# Patient Record
Sex: FEMALE | Race: WHITE | Employment: OTHER | URBAN - METROPOLITAN AREA
[De-identification: names, ages, dates, MRNs, and addresses within clinical notes are randomized per-mention and may not be internally consistent; named-entity substitution may affect disease eponyms.]

---

## 2018-10-22 RX ORDER — EZETIMIBE 10 MG/1
10 TABLET ORAL NIGHTLY
COMMUNITY

## 2018-10-22 RX ORDER — SIMVASTATIN 40 MG
40 TABLET ORAL
COMMUNITY
End: 2018-12-27

## 2018-10-22 RX ORDER — OMEPRAZOLE 10 MG/1
20 CAPSULE, DELAYED RELEASE ORAL DAILY
COMMUNITY

## 2018-10-22 RX ORDER — SENNA PLUS 8.6 MG/1
1 TABLET ORAL EVERY OTHER DAY
COMMUNITY

## 2018-10-22 RX ORDER — FERROUS SULFATE 325(65) MG
325 TABLET ORAL 2 TIMES DAILY
COMMUNITY

## 2018-10-23 ENCOUNTER — ANESTHESIA EVENT (OUTPATIENT)
Dept: ENDOSCOPY | Age: 63
End: 2018-10-23
Payer: COMMERCIAL

## 2018-11-02 ENCOUNTER — ANESTHESIA (OUTPATIENT)
Dept: ENDOSCOPY | Age: 63
End: 2018-11-02
Payer: COMMERCIAL

## 2018-11-02 ENCOUNTER — HOSPITAL ENCOUNTER (OUTPATIENT)
Age: 63
Setting detail: OUTPATIENT SURGERY
Discharge: HOME | End: 2018-11-02
Attending: INTERNAL MEDICINE | Admitting: INTERNAL MEDICINE
Payer: COMMERCIAL

## 2018-11-02 VITALS
SYSTOLIC BLOOD PRESSURE: 127 MMHG | HEIGHT: 64 IN | WEIGHT: 208 LBS | RESPIRATION RATE: 14 BRPM | BODY MASS INDEX: 35.51 KG/M2 | TEMPERATURE: 98.1 F | OXYGEN SATURATION: 99 % | HEART RATE: 85 BPM | DIASTOLIC BLOOD PRESSURE: 76 MMHG

## 2018-11-02 VITALS — SYSTOLIC BLOOD PRESSURE: 129 MMHG | DIASTOLIC BLOOD PRESSURE: 85 MMHG | OXYGEN SATURATION: 94 %

## 2018-11-02 LAB
GLUCOSE BLD-MCNC: 85 MG/DL (ref 70–99)
PERFORMED ON: NORMAL

## 2018-11-02 PROCEDURE — 3700000000 HC ANESTHESIA ATTENDED CARE: Performed by: INTERNAL MEDICINE

## 2018-11-02 PROCEDURE — 2709999900 HC NON-CHARGEABLE SUPPLY: Performed by: INTERNAL MEDICINE

## 2018-11-02 PROCEDURE — 7100000011 HC PHASE II RECOVERY - ADDTL 15 MIN: Performed by: INTERNAL MEDICINE

## 2018-11-02 PROCEDURE — 6360000002 HC RX W HCPCS: Performed by: NURSE ANESTHETIST, CERTIFIED REGISTERED

## 2018-11-02 PROCEDURE — 2500000003 HC RX 250 WO HCPCS: Performed by: ANESTHESIOLOGY

## 2018-11-02 PROCEDURE — 2500000003 HC RX 250 WO HCPCS: Performed by: NURSE ANESTHETIST, CERTIFIED REGISTERED

## 2018-11-02 PROCEDURE — 3609012400 HC EGD TRANSORAL BIOPSY SINGLE/MULTIPLE: Performed by: INTERNAL MEDICINE

## 2018-11-02 PROCEDURE — 2580000003 HC RX 258: Performed by: FAMILY MEDICINE

## 2018-11-02 PROCEDURE — 7100000010 HC PHASE II RECOVERY - FIRST 15 MIN: Performed by: INTERNAL MEDICINE

## 2018-11-02 PROCEDURE — 2580000003 HC RX 258: Performed by: NURSE ANESTHETIST, CERTIFIED REGISTERED

## 2018-11-02 RX ORDER — SODIUM CHLORIDE 9 MG/ML
INJECTION, SOLUTION INTRAVENOUS CONTINUOUS PRN
Status: DISCONTINUED | OUTPATIENT
Start: 2018-11-02 | End: 2018-11-02 | Stop reason: SDUPTHER

## 2018-11-02 RX ORDER — SODIUM CHLORIDE 9 MG/ML
INJECTION, SOLUTION INTRAVENOUS CONTINUOUS
Status: DISCONTINUED | OUTPATIENT
Start: 2018-11-02 | End: 2018-11-02 | Stop reason: HOSPADM

## 2018-11-02 RX ORDER — PROPOFOL 10 MG/ML
INJECTION, EMULSION INTRAVENOUS PRN
Status: DISCONTINUED | OUTPATIENT
Start: 2018-11-02 | End: 2018-11-02 | Stop reason: SDUPTHER

## 2018-11-02 RX ORDER — METOPROLOL TARTRATE 5 MG/5ML
1 INJECTION INTRAVENOUS ONCE
Status: COMPLETED | OUTPATIENT
Start: 2018-11-02 | End: 2018-11-02

## 2018-11-02 RX ORDER — LIDOCAINE HYDROCHLORIDE 20 MG/ML
INJECTION, SOLUTION INFILTRATION; PERINEURAL PRN
Status: DISCONTINUED | OUTPATIENT
Start: 2018-11-02 | End: 2018-11-02 | Stop reason: SDUPTHER

## 2018-11-02 RX ORDER — METOPROLOL TARTRATE 5 MG/5ML
5 INJECTION INTRAVENOUS ONCE
Status: DISCONTINUED | OUTPATIENT
Start: 2018-11-02 | End: 2018-11-02 | Stop reason: HOSPADM

## 2018-11-02 RX ORDER — SODIUM CHLORIDE 0.9 % (FLUSH) 0.9 %
10 SYRINGE (ML) INJECTION EVERY 12 HOURS SCHEDULED
Status: DISCONTINUED | OUTPATIENT
Start: 2018-11-02 | End: 2018-11-02 | Stop reason: HOSPADM

## 2018-11-02 RX ORDER — SODIUM CHLORIDE 0.9 % (FLUSH) 0.9 %
10 SYRINGE (ML) INJECTION PRN
Status: DISCONTINUED | OUTPATIENT
Start: 2018-11-02 | End: 2018-11-02 | Stop reason: HOSPADM

## 2018-11-02 RX ADMIN — METOPROLOL TARTRATE 1 MG: 1 INJECTION, SOLUTION INTRAVENOUS at 09:41

## 2018-11-02 RX ADMIN — SODIUM CHLORIDE: 9 INJECTION, SOLUTION INTRAVENOUS at 09:40

## 2018-11-02 RX ADMIN — PROPOFOL 120 MG: 10 INJECTION, EMULSION INTRAVENOUS at 09:56

## 2018-11-02 RX ADMIN — LIDOCAINE HYDROCHLORIDE 100 MG: 20 INJECTION, SOLUTION INFILTRATION; PERINEURAL at 09:51

## 2018-11-02 RX ADMIN — SODIUM CHLORIDE: 9 INJECTION, SOLUTION INTRAVENOUS at 09:46

## 2018-11-02 RX ADMIN — METOPROLOL TARTRATE 1 MG: 1 INJECTION, SOLUTION INTRAVENOUS at 09:33

## 2018-11-02 ASSESSMENT — PAIN - FUNCTIONAL ASSESSMENT: PAIN_FUNCTIONAL_ASSESSMENT: 0-10

## 2018-11-02 ASSESSMENT — PAIN SCALES - GENERAL: PAINLEVEL_OUTOF10: 0

## 2018-11-02 NOTE — PROGRESS NOTES
Name:  Dejon Lam  Age:  59 y.o.  CSN:  [id number]            Past Medical History:        Diagnosis Date    Anemia     iron deficiency    GERD (gastroesophageal reflux disease)     Hyperlipidemia     PCOS (polycystic ovarian syndrome)     insulin resistance    Sinusitis        Past Surgical History:      Procedure Laterality Date     SECTION      CHOLECYSTECTOMY      DILATION AND CURETTAGE OF UTERUS      x2    TOTAL HIP ARTHROPLASTY Right 2014    TUBAL LIGATION         Medications Prior to Admission:  Prescriptions Prior to Admission: omeprazole (PRILOSEC) 10 MG delayed release capsule, Take 20 mg by mouth daily  Loratadine-Pseudoephedrine (CLARITIN-D 24 HOUR PO), Take by mouth daily  ferrous sulfate 325 (65 Fe) MG tablet, Take 325 mg by mouth 2 times daily  Metoprolol Succinate 25 MG CS24, Take 12.5 mg by mouth daily  metFORMIN (GLUCOPHAGE) 500 MG tablet, Take 1,000 mg by mouth nightly  simvastatin (ZOCOR) 40 MG tablet, Take 40 mg by mouth  ezetimibe (ZETIA) 10 MG tablet, Take 10 mg by mouth nightly  aspirin 81 MG tablet, Take 81 mg by mouth daily  senna (SENOKOT) 8.6 MG tablet, Take 1 tablet by mouth every other day    Allergies:  Patient has no known allergies. Social History:  Social History     Social History    Marital status:      Spouse name: N/A    Number of children: N/A    Years of education: N/A     Occupational History    Not on file. Social History Main Topics    Smoking status: Current Every Day Smoker     Packs/day: 0.50     Years: 48.00    Smokeless tobacco: Never Used    Alcohol use No    Drug use: No    Sexual activity: Not on file     Other Topics Concern    Not on file     Social History Narrative    No narrative on file       Family History:  History reviewed. No pertinent family history.

## 2018-11-02 NOTE — ANESTHESIA POSTPROCEDURE EVALUATION
Department of Anesthesiology  Postprocedure Note    Patient: Duane Lambing  MRN: [id number]  YOB: 1955  Date of evaluation: 11/2/2018  Time:  10:08 AM     Procedure Summary     Date:  11/02/18 Room / Location:  [facility] ENDO 02 / [facility] ENDOSCOPY    Anesthesia Start:  4022 Anesthesia Stop:  1001    Procedure:  EGD BIOPSY (N/A ) Diagnosis:  (ANEMIA R64.9)    Surgeon:  Annette Li MD Responsible Provider:  Dede Merino MD    Anesthesia Type:  MAC ASA Status:  2          Anesthesia Type: MAC    Herlinda Phase I: Herlinda Score: 10    Herlinda Phase II: Herlinda Score: 10    Last vitals: Reviewed and per EMR flowsheets.        Anesthesia Post Evaluation    Patient location during evaluation: PACU  Patient participation: complete - patient participated  Level of consciousness: awake and awake and alert  Pain score: 2  Airway patency: patent  Nausea & Vomiting: no vomiting  Complications: no  Cardiovascular status: blood pressure returned to baseline  Respiratory status: acceptable  Hydration status: euvolemic

## 2018-11-02 NOTE — PROGRESS NOTES
's ST on tele upon admission. Asymptomatic. Dr. Suze Yip at bedside and lopressor 1mg IV given at 9324 and 8547 as ordered. HR now 115 ST on tele.   OK to proceed for EGD per Dr. Suze Yip.

## 2018-11-15 ENCOUNTER — HOSPITAL ENCOUNTER (OUTPATIENT)
Age: 63
Discharge: HOME | End: 2018-11-15
Payer: COMMERCIAL

## 2018-11-15 LAB
ALBUMIN SERPL-MCNC: 3.4 G/DL (ref 3.4–5)
ALP BLD-CCNC: 181 U/L (ref 40–129)
ALT SERPL-CCNC: 20 U/L (ref 10–40)
ANISOCYTOSIS: ABNORMAL
AST SERPL-CCNC: 40 U/L (ref 15–37)
BASOPHILS ABSOLUTE: 0.1 K/UL (ref 0–0.2)
BASOPHILS RELATIVE PERCENT: 2.1 %
BILIRUB SERPL-MCNC: 2.3 MG/DL (ref 0–1)
BILIRUBIN DIRECT: 0.9 MG/DL (ref 0–0.3)
BILIRUBIN, INDIRECT: 1.4 MG/DL (ref 0–1)
EOSINOPHILS ABSOLUTE: 0.3 K/UL (ref 0–0.6)
EOSINOPHILS RELATIVE PERCENT: 8.5 %
HAV IGM SER IA-ACNC: NORMAL
HBV SURFACE AB TITR SER: <3.5 MIU/ML
HCT VFR BLD CALC: 37.3 % (ref 36–48)
HEMOGLOBIN: 12.1 G/DL (ref 12–16)
HEPATITIS B SURFACE ANTIGEN INTERPRETATION: NORMAL
HEPATITIS C ANTIBODY INTERPRETATION: NORMAL
INR BLD: 1.2 (ref 0.86–1.14)
LYMPHOCYTES ABSOLUTE: 1.3 K/UL (ref 1–5.1)
LYMPHOCYTES RELATIVE PERCENT: 33.1 %
MCH RBC QN AUTO: 31.6 PG (ref 26–34)
MCHC RBC AUTO-ENTMCNC: 32.5 G/DL (ref 31–36)
MCV RBC AUTO: 97.2 FL (ref 80–100)
MONOCYTES ABSOLUTE: 0.4 K/UL (ref 0–1.3)
MONOCYTES RELATIVE PERCENT: 10.1 %
NEUTROPHILS ABSOLUTE: 1.9 K/UL (ref 1.7–7.7)
NEUTROPHILS RELATIVE PERCENT: 46.2 %
OVALOCYTES: ABNORMAL
PDW BLD-RTO: 24.7 % (ref 12.4–15.4)
PLATELET # BLD: 106 K/UL (ref 135–450)
PMV BLD AUTO: 8.2 FL (ref 5–10.5)
POIKILOCYTES: ABNORMAL
POLYCHROMASIA: ABNORMAL
PROTHROMBIN TIME: 13.7 SEC (ref 9.8–13)
RBC # BLD: 3.84 M/UL (ref 4–5.2)
SCHISTOCYTES: ABNORMAL
TOTAL PROTEIN: 7.6 G/DL (ref 6.4–8.2)
WBC # BLD: 4.1 K/UL (ref 4–11)

## 2018-11-15 PROCEDURE — 82103 ALPHA-1-ANTITRYPSIN TOTAL: CPT

## 2018-11-15 PROCEDURE — 80076 HEPATIC FUNCTION PANEL: CPT

## 2018-11-15 PROCEDURE — 83516 IMMUNOASSAY NONANTIBODY: CPT

## 2018-11-15 PROCEDURE — 86038 ANTINUCLEAR ANTIBODIES: CPT

## 2018-11-15 PROCEDURE — 36415 COLL VENOUS BLD VENIPUNCTURE: CPT

## 2018-11-15 PROCEDURE — 85025 COMPLETE CBC W/AUTO DIFF WBC: CPT

## 2018-11-15 PROCEDURE — 86803 HEPATITIS C AB TEST: CPT

## 2018-11-15 PROCEDURE — 86709 HEPATITIS A IGM ANTIBODY: CPT

## 2018-11-15 PROCEDURE — 86707 HEPATITIS BE ANTIBODY: CPT

## 2018-11-15 PROCEDURE — 87340 HEPATITIS B SURFACE AG IA: CPT

## 2018-11-15 PROCEDURE — 82105 ALPHA-FETOPROTEIN SERUM: CPT

## 2018-11-15 PROCEDURE — 85610 PROTHROMBIN TIME: CPT

## 2018-11-15 PROCEDURE — 86706 HEP B SURFACE ANTIBODY: CPT

## 2018-11-15 PROCEDURE — 86708 HEPATITIS A ANTIBODY: CPT

## 2018-11-16 LAB
ANA INTERPRETATION: NORMAL
ANTI-NUCLEAR ANTIBODY (ANA): NEGATIVE

## 2018-11-17 LAB — HAV AB SERPL IA-ACNC: POSITIVE

## 2018-11-18 LAB
F-ACTIN AB IGG: 11 UNITS (ref 0–19)
HEPATITIS BE ANTIBODY: NEGATIVE
MITOCHONDRIAL M2 AB, IGG: 5.5 UNITS (ref 0–20)

## 2018-11-19 ENCOUNTER — ANESTHESIA EVENT (OUTPATIENT)
Dept: ENDOSCOPY | Age: 63
End: 2018-11-19
Payer: COMMERCIAL

## 2018-11-20 LAB
AFP: 2.2 UG/L
ALPHA-1 ANTITRYPSIN: 152 MG/DL (ref 90–200)

## 2018-12-07 ENCOUNTER — ANESTHESIA (OUTPATIENT)
Dept: ENDOSCOPY | Age: 63
End: 2018-12-07
Payer: COMMERCIAL

## 2018-12-31 ENCOUNTER — HOSPITAL ENCOUNTER (OUTPATIENT)
Age: 63
Setting detail: OUTPATIENT SURGERY
Discharge: HOME | End: 2018-12-31
Attending: INTERNAL MEDICINE | Admitting: INTERNAL MEDICINE
Payer: COMMERCIAL

## 2018-12-31 VITALS
RESPIRATION RATE: 18 BRPM | HEART RATE: 78 BPM | SYSTOLIC BLOOD PRESSURE: 113 MMHG | OXYGEN SATURATION: 96 % | WEIGHT: 219 LBS | TEMPERATURE: 100 F | HEIGHT: 65 IN | BODY MASS INDEX: 36.49 KG/M2 | DIASTOLIC BLOOD PRESSURE: 64 MMHG

## 2018-12-31 VITALS
OXYGEN SATURATION: 100 % | DIASTOLIC BLOOD PRESSURE: 57 MMHG | RESPIRATION RATE: 21 BRPM | SYSTOLIC BLOOD PRESSURE: 110 MMHG

## 2018-12-31 LAB
A/G RATIO: 0.9 (ref 1.1–2.2)
ALBUMIN SERPL-MCNC: 3.2 G/DL (ref 3.4–5)
ALP BLD-CCNC: 190 U/L (ref 40–129)
ALT SERPL-CCNC: 18 U/L (ref 10–40)
ANION GAP SERPL CALCULATED.3IONS-SCNC: 9 MMOL/L (ref 3–16)
AST SERPL-CCNC: 36 U/L (ref 15–37)
BASOPHILS ABSOLUTE: 0 K/UL (ref 0–0.2)
BASOPHILS RELATIVE PERCENT: 0.7 %
BILIRUB SERPL-MCNC: 2.5 MG/DL (ref 0–1)
BUN BLDV-MCNC: 7 MG/DL (ref 7–20)
CALCIUM SERPL-MCNC: 9.1 MG/DL (ref 8.3–10.6)
CHLORIDE BLD-SCNC: 106 MMOL/L (ref 99–110)
CO2: 26 MMOL/L (ref 21–32)
CREAT SERPL-MCNC: <0.5 MG/DL (ref 0.6–1.2)
EOSINOPHILS ABSOLUTE: 0.2 K/UL (ref 0–0.6)
EOSINOPHILS RELATIVE PERCENT: 4.9 %
GFR AFRICAN AMERICAN: >60
GFR NON-AFRICAN AMERICAN: >60
GLOBULIN: 3.6 G/DL
GLUCOSE BLD-MCNC: 105 MG/DL (ref 70–99)
HCT VFR BLD CALC: 39.2 % (ref 36–48)
HEMOGLOBIN: 13.1 G/DL (ref 12–16)
LYMPHOCYTES ABSOLUTE: 0.6 K/UL (ref 1–5.1)
LYMPHOCYTES RELATIVE PERCENT: 18.6 %
MCH RBC QN AUTO: 35.2 PG (ref 26–34)
MCHC RBC AUTO-ENTMCNC: 33.3 G/DL (ref 31–36)
MCV RBC AUTO: 105.7 FL (ref 80–100)
MONOCYTES ABSOLUTE: 0.5 K/UL (ref 0–1.3)
MONOCYTES RELATIVE PERCENT: 13.3 %
NEUTROPHILS ABSOLUTE: 2.1 K/UL (ref 1.7–7.7)
NEUTROPHILS RELATIVE PERCENT: 62.5 %
PDW BLD-RTO: 18.4 % (ref 12.4–15.4)
PLATELET # BLD: 93 K/UL (ref 135–450)
PLATELET SLIDE REVIEW: ABNORMAL
PMV BLD AUTO: 8.2 FL (ref 5–10.5)
POTASSIUM SERPL-SCNC: 3.9 MMOL/L (ref 3.5–5.1)
RBC # BLD: 3.71 M/UL (ref 4–5.2)
SLIDE REVIEW: ABNORMAL
SODIUM BLD-SCNC: 141 MMOL/L (ref 136–145)
TOTAL PROTEIN: 6.8 G/DL (ref 6.4–8.2)
WBC # BLD: 3.4 K/UL (ref 4–11)

## 2018-12-31 PROCEDURE — 2500000003 HC RX 250 WO HCPCS: Performed by: NURSE ANESTHETIST, CERTIFIED REGISTERED

## 2018-12-31 PROCEDURE — 3700000001 HC ADD 15 MINUTES (ANESTHESIA): Performed by: INTERNAL MEDICINE

## 2018-12-31 PROCEDURE — 3700000000 HC ANESTHESIA ATTENDED CARE: Performed by: INTERNAL MEDICINE

## 2018-12-31 PROCEDURE — 7100000010 HC PHASE II RECOVERY - FIRST 15 MIN: Performed by: INTERNAL MEDICINE

## 2018-12-31 PROCEDURE — 6370000000 HC RX 637 (ALT 250 FOR IP): Performed by: ANESTHESIOLOGY

## 2018-12-31 PROCEDURE — 2709999900 HC NON-CHARGEABLE SUPPLY: Performed by: INTERNAL MEDICINE

## 2018-12-31 PROCEDURE — 3609009900 HC COLONOSCOPY W/CONTROL BLEEDING ANY METHOD: Performed by: INTERNAL MEDICINE

## 2018-12-31 PROCEDURE — 6370000000 HC RX 637 (ALT 250 FOR IP): Performed by: INTERNAL MEDICINE

## 2018-12-31 PROCEDURE — 3609010600 HC COLONOSCOPY POLYPECTOMY SNARE/COLD BIOPSY: Performed by: INTERNAL MEDICINE

## 2018-12-31 PROCEDURE — 36415 COLL VENOUS BLD VENIPUNCTURE: CPT

## 2018-12-31 PROCEDURE — 80053 COMPREHEN METABOLIC PANEL: CPT

## 2018-12-31 PROCEDURE — 85025 COMPLETE CBC W/AUTO DIFF WBC: CPT

## 2018-12-31 PROCEDURE — 2720000010 HC SURG SUPPLY STERILE: Performed by: INTERNAL MEDICINE

## 2018-12-31 PROCEDURE — 7100000011 HC PHASE II RECOVERY - ADDTL 15 MIN: Performed by: INTERNAL MEDICINE

## 2018-12-31 PROCEDURE — 6360000002 HC RX W HCPCS: Performed by: NURSE ANESTHETIST, CERTIFIED REGISTERED

## 2018-12-31 PROCEDURE — 2580000003 HC RX 258: Performed by: ANESTHESIOLOGY

## 2018-12-31 RX ORDER — LIDOCAINE HYDROCHLORIDE 20 MG/ML
INJECTION, SOLUTION INFILTRATION; PERINEURAL PRN
Status: DISCONTINUED | OUTPATIENT
Start: 2018-12-31 | End: 2018-12-31 | Stop reason: SDUPTHER

## 2018-12-31 RX ORDER — IPRATROPIUM BROMIDE AND ALBUTEROL SULFATE 2.5; .5 MG/3ML; MG/3ML
1 SOLUTION RESPIRATORY (INHALATION) EVERY 4 HOURS PRN
Status: DISCONTINUED | OUTPATIENT
Start: 2018-12-31 | End: 2018-12-31 | Stop reason: HOSPADM

## 2018-12-31 RX ORDER — PROPOFOL 10 MG/ML
INJECTION, EMULSION INTRAVENOUS PRN
Status: DISCONTINUED | OUTPATIENT
Start: 2018-12-31 | End: 2018-12-31 | Stop reason: SDUPTHER

## 2018-12-31 RX ORDER — SODIUM CHLORIDE 9 MG/ML
INJECTION, SOLUTION INTRAVENOUS CONTINUOUS
Status: DISCONTINUED | OUTPATIENT
Start: 2018-12-31 | End: 2018-12-31 | Stop reason: HOSPADM

## 2018-12-31 RX ADMIN — PROPOFOL 80 MG: 10 INJECTION, EMULSION INTRAVENOUS at 11:06

## 2018-12-31 RX ADMIN — PROPOFOL 50 MG: 10 INJECTION, EMULSION INTRAVENOUS at 11:29

## 2018-12-31 RX ADMIN — IPRATROPIUM BROMIDE AND ALBUTEROL SULFATE 1 AMPULE: .5; 3 SOLUTION RESPIRATORY (INHALATION) at 10:54

## 2018-12-31 RX ADMIN — PROPOFOL 100 MG: 10 INJECTION, EMULSION INTRAVENOUS at 11:23

## 2018-12-31 RX ADMIN — PROPOFOL 40 MG: 10 INJECTION, EMULSION INTRAVENOUS at 11:17

## 2018-12-31 RX ADMIN — SODIUM CHLORIDE: 9 INJECTION, SOLUTION INTRAVENOUS at 10:53

## 2018-12-31 RX ADMIN — LIDOCAINE HYDROCHLORIDE 100 MG: 20 INJECTION, SOLUTION INFILTRATION; PERINEURAL at 11:06

## 2018-12-31 RX ADMIN — PHENYLEPHRINE HYDROCHLORIDE 50 MCG: 10 INJECTION INTRAVENOUS at 11:11

## 2018-12-31 RX ADMIN — PROPOFOL 80 MG: 10 INJECTION, EMULSION INTRAVENOUS at 11:12

## 2018-12-31 ASSESSMENT — PAIN SCALES - GENERAL
PAINLEVEL_OUTOF10: 0

## 2018-12-31 ASSESSMENT — PAIN - FUNCTIONAL ASSESSMENT: PAIN_FUNCTIONAL_ASSESSMENT: 0-10

## 2019-01-27 ENCOUNTER — HOSPITAL ENCOUNTER (EMERGENCY)
Age: 64
Discharge: HOME | End: 2019-01-28
Attending: EMERGENCY MEDICINE
Payer: COMMERCIAL

## 2019-01-27 VITALS
BODY MASS INDEX: 36.49 KG/M2 | SYSTOLIC BLOOD PRESSURE: 165 MMHG | DIASTOLIC BLOOD PRESSURE: 93 MMHG | RESPIRATION RATE: 22 BRPM | HEIGHT: 65 IN | WEIGHT: 219 LBS | OXYGEN SATURATION: 94 % | TEMPERATURE: 98.3 F | HEART RATE: 78 BPM

## 2019-01-27 DIAGNOSIS — J44.1 COPD EXACERBATION (HCC): Primary | ICD-10-CM

## 2019-01-27 LAB
A/G RATIO: 0.9 (ref 1.1–2.2)
ALBUMIN SERPL-MCNC: 3.3 G/DL (ref 3.4–5)
ALP BLD-CCNC: 209 U/L (ref 40–129)
ALT SERPL-CCNC: 18 U/L (ref 10–40)
ANION GAP SERPL CALCULATED.3IONS-SCNC: 11 MMOL/L (ref 3–16)
AST SERPL-CCNC: 43 U/L (ref 15–37)
BASOPHILS ABSOLUTE: 0.1 K/UL (ref 0–0.2)
BASOPHILS RELATIVE PERCENT: 1.2 %
BILIRUB SERPL-MCNC: 1.5 MG/DL (ref 0–1)
BUN BLDV-MCNC: 11 MG/DL (ref 7–20)
CALCIUM SERPL-MCNC: 9.9 MG/DL (ref 8.3–10.6)
CHLORIDE BLD-SCNC: 103 MMOL/L (ref 99–110)
CO2: 25 MMOL/L (ref 21–32)
CREAT SERPL-MCNC: 0.6 MG/DL (ref 0.6–1.2)
EOSINOPHILS ABSOLUTE: 0.4 K/UL (ref 0–0.6)
EOSINOPHILS RELATIVE PERCENT: 7.7 %
GFR AFRICAN AMERICAN: >60
GFR NON-AFRICAN AMERICAN: >60
GLOBULIN: 3.8 G/DL
GLUCOSE BLD-MCNC: 99 MG/DL (ref 70–99)
HCT VFR BLD CALC: 41 % (ref 36–48)
HEMOGLOBIN: 13.6 G/DL (ref 12–16)
LYMPHOCYTES ABSOLUTE: 1.7 K/UL (ref 1–5.1)
LYMPHOCYTES RELATIVE PERCENT: 35.8 %
MCH RBC QN AUTO: 35.9 PG (ref 26–34)
MCHC RBC AUTO-ENTMCNC: 33.2 G/DL (ref 31–36)
MCV RBC AUTO: 108 FL (ref 80–100)
MONOCYTES ABSOLUTE: 0.6 K/UL (ref 0–1.3)
MONOCYTES RELATIVE PERCENT: 13.2 %
NEUTROPHILS ABSOLUTE: 2 K/UL (ref 1.7–7.7)
NEUTROPHILS RELATIVE PERCENT: 42.1 %
PDW BLD-RTO: 15.9 % (ref 12.4–15.4)
PLATELET # BLD: 113 K/UL (ref 135–450)
PMV BLD AUTO: 8.4 FL (ref 5–10.5)
POTASSIUM SERPL-SCNC: 4.4 MMOL/L (ref 3.5–5.1)
RBC # BLD: 3.8 M/UL (ref 4–5.2)
SODIUM BLD-SCNC: 139 MMOL/L (ref 136–145)
TOTAL PROTEIN: 7.1 G/DL (ref 6.4–8.2)
WBC # BLD: 4.8 K/UL (ref 4–11)

## 2019-01-27 PROCEDURE — 99283 EMERGENCY DEPT VISIT LOW MDM: CPT

## 2019-01-27 PROCEDURE — 85025 COMPLETE CBC W/AUTO DIFF WBC: CPT

## 2019-01-27 PROCEDURE — 80053 COMPREHEN METABOLIC PANEL: CPT

## 2019-01-27 RX ORDER — PREDNISONE 10 MG/1
60 TABLET ORAL DAILY
Qty: 30 TABLET | Refills: 0 | Status: SHIPPED | OUTPATIENT
Start: 2019-01-27 | End: 2019-02-01

## 2019-01-28 ASSESSMENT — ENCOUNTER SYMPTOMS
NAUSEA: 0
RHINORRHEA: 0
DIARRHEA: 0
ABDOMINAL PAIN: 0
VOMITING: 0
COUGH: 0
SHORTNESS OF BREATH: 0

## 2019-06-10 ENCOUNTER — APPOINTMENT (OUTPATIENT)
Dept: CT IMAGING | Age: 64
DRG: 442 | End: 2019-06-10
Payer: COMMERCIAL

## 2019-06-10 ENCOUNTER — HOSPITAL ENCOUNTER (INPATIENT)
Age: 64
LOS: 4 days | Discharge: NURSING, REHAB OR LONG-TERM CARE | DRG: 442 | End: 2019-06-14
Attending: EMERGENCY MEDICINE | Admitting: INTERNAL MEDICINE
Payer: COMMERCIAL

## 2019-06-10 ENCOUNTER — APPOINTMENT (OUTPATIENT)
Dept: GENERAL RADIOLOGY | Age: 64
DRG: 442 | End: 2019-06-10
Payer: COMMERCIAL

## 2019-06-10 DIAGNOSIS — E80.6 HYPERBILIRUBINEMIA: Primary | ICD-10-CM

## 2019-06-10 DIAGNOSIS — R60.1 ANASARCA: ICD-10-CM

## 2019-06-10 DIAGNOSIS — R41.0 CONFUSION: ICD-10-CM

## 2019-06-10 DIAGNOSIS — N30.00 ACUTE CYSTITIS WITHOUT HEMATURIA: ICD-10-CM

## 2019-06-10 DIAGNOSIS — M25.512 LEFT SHOULDER PAIN, UNSPECIFIED CHRONICITY: ICD-10-CM

## 2019-06-10 PROBLEM — N39.0 UTI (URINARY TRACT INFECTION): Status: ACTIVE | Noted: 2019-06-10

## 2019-06-10 LAB
ACETAMINOPHEN LEVEL: <5 UG/ML (ref 10–30)
ALBUMIN SERPL-MCNC: 2.5 G/DL (ref 3.4–5)
ALP BLD-CCNC: 194 U/L (ref 40–129)
ALT SERPL-CCNC: 20 U/L (ref 10–40)
AMMONIA: 45 UMOL/L (ref 11–51)
ANION GAP SERPL CALCULATED.3IONS-SCNC: 9 MMOL/L (ref 3–16)
AST SERPL-CCNC: 53 U/L (ref 15–37)
BACTERIA: ABNORMAL /HPF
BASOPHILS ABSOLUTE: 0 K/UL (ref 0–0.2)
BASOPHILS RELATIVE PERCENT: 1.1 %
BILIRUB SERPL-MCNC: 4.8 MG/DL (ref 0–1)
BILIRUBIN DIRECT: 1.6 MG/DL (ref 0–0.3)
BILIRUBIN URINE: ABNORMAL
BILIRUBIN, INDIRECT: 3.2 MG/DL (ref 0–1)
BLOOD, URINE: ABNORMAL
BUN BLDV-MCNC: 5 MG/DL (ref 7–20)
CALCIUM SERPL-MCNC: 8.6 MG/DL (ref 8.3–10.6)
CHLORIDE BLD-SCNC: 103 MMOL/L (ref 99–110)
CLARITY: ABNORMAL
CO2: 27 MMOL/L (ref 21–32)
COLOR: ABNORMAL
CREAT SERPL-MCNC: <0.5 MG/DL (ref 0.6–1.2)
EOSINOPHILS ABSOLUTE: 0.3 K/UL (ref 0–0.6)
EOSINOPHILS RELATIVE PERCENT: 7.2 %
EPITHELIAL CELLS, UA: 3 /HPF (ref 0–5)
GFR AFRICAN AMERICAN: >60
GFR NON-AFRICAN AMERICAN: >60
GLUCOSE BLD-MCNC: 96 MG/DL (ref 70–99)
GLUCOSE URINE: NEGATIVE MG/DL
HCT VFR BLD CALC: 32.9 % (ref 36–48)
HEMOGLOBIN: 10.7 G/DL (ref 12–16)
HYALINE CASTS: 17 /LPF (ref 0–8)
INR BLD: 1.68 (ref 0.86–1.14)
KETONES, URINE: ABNORMAL MG/DL
LACTIC ACID: 2.1 MMOL/L (ref 0.4–2)
LEUKOCYTE ESTERASE, URINE: ABNORMAL
LIPASE: 40 U/L (ref 13–60)
LYMPHOCYTES ABSOLUTE: 1.3 K/UL (ref 1–5.1)
LYMPHOCYTES RELATIVE PERCENT: 31.4 %
MCH RBC QN AUTO: 34.8 PG (ref 26–34)
MCHC RBC AUTO-ENTMCNC: 32.6 G/DL (ref 31–36)
MCV RBC AUTO: 106.8 FL (ref 80–100)
MICROSCOPIC EXAMINATION: YES
MONOCYTES ABSOLUTE: 0.6 K/UL (ref 0–1.3)
MONOCYTES RELATIVE PERCENT: 13.4 %
NEUTROPHILS ABSOLUTE: 2 K/UL (ref 1.7–7.7)
NEUTROPHILS RELATIVE PERCENT: 46.9 %
NITRITE, URINE: POSITIVE
PDW BLD-RTO: 20.7 % (ref 12.4–15.4)
PH UA: 6 (ref 5–8)
PLATELET # BLD: 149 K/UL (ref 135–450)
PMV BLD AUTO: 7.7 FL (ref 5–10.5)
POTASSIUM REFLEX MAGNESIUM: 3.8 MMOL/L (ref 3.5–5.1)
PRO-BNP: 4206 PG/ML (ref 0–124)
PROTEIN UA: NEGATIVE MG/DL
PROTHROMBIN TIME: 19.1 SEC (ref 9.8–13)
RBC # BLD: 3.08 M/UL (ref 4–5.2)
RBC UA: 3 /HPF (ref 0–4)
SODIUM BLD-SCNC: 139 MMOL/L (ref 136–145)
SPECIFIC GRAVITY UA: 1.02 (ref 1–1.03)
TOTAL PROTEIN: 6.5 G/DL (ref 6.4–8.2)
TROPONIN: <0.01 NG/ML
URINE REFLEX TO CULTURE: YES
URINE TYPE: ABNORMAL
UROBILINOGEN, URINE: 4 E.U./DL
WBC # BLD: 4.2 K/UL (ref 4–11)
WBC UA: 54 /HPF (ref 0–5)

## 2019-06-10 PROCEDURE — 85610 PROTHROMBIN TIME: CPT

## 2019-06-10 PROCEDURE — 96374 THER/PROPH/DIAG INJ IV PUSH: CPT

## 2019-06-10 PROCEDURE — 93005 ELECTROCARDIOGRAM TRACING: CPT | Performed by: EMERGENCY MEDICINE

## 2019-06-10 PROCEDURE — 83605 ASSAY OF LACTIC ACID: CPT

## 2019-06-10 PROCEDURE — 82140 ASSAY OF AMMONIA: CPT

## 2019-06-10 PROCEDURE — 80048 BASIC METABOLIC PNL TOTAL CA: CPT

## 2019-06-10 PROCEDURE — 83690 ASSAY OF LIPASE: CPT

## 2019-06-10 PROCEDURE — 87040 BLOOD CULTURE FOR BACTERIA: CPT

## 2019-06-10 PROCEDURE — 87077 CULTURE AEROBIC IDENTIFY: CPT

## 2019-06-10 PROCEDURE — 87186 SC STD MICRODIL/AGAR DIL: CPT

## 2019-06-10 PROCEDURE — 36415 COLL VENOUS BLD VENIPUNCTURE: CPT

## 2019-06-10 PROCEDURE — 70450 CT HEAD/BRAIN W/O DYE: CPT

## 2019-06-10 PROCEDURE — 85025 COMPLETE CBC W/AUTO DIFF WBC: CPT

## 2019-06-10 PROCEDURE — 74177 CT ABD & PELVIS W/CONTRAST: CPT

## 2019-06-10 PROCEDURE — 71046 X-RAY EXAM CHEST 2 VIEWS: CPT

## 2019-06-10 PROCEDURE — 6360000004 HC RX CONTRAST MEDICATION: Performed by: EMERGENCY MEDICINE

## 2019-06-10 PROCEDURE — G0480 DRUG TEST DEF 1-7 CLASSES: HCPCS

## 2019-06-10 PROCEDURE — 83880 ASSAY OF NATRIURETIC PEPTIDE: CPT

## 2019-06-10 PROCEDURE — 99291 CRITICAL CARE FIRST HOUR: CPT

## 2019-06-10 PROCEDURE — 6360000002 HC RX W HCPCS: Performed by: EMERGENCY MEDICINE

## 2019-06-10 PROCEDURE — 6370000000 HC RX 637 (ALT 250 FOR IP): Performed by: INTERNAL MEDICINE

## 2019-06-10 PROCEDURE — 81001 URINALYSIS AUTO W/SCOPE: CPT

## 2019-06-10 PROCEDURE — 87086 URINE CULTURE/COLONY COUNT: CPT

## 2019-06-10 PROCEDURE — 80076 HEPATIC FUNCTION PANEL: CPT

## 2019-06-10 PROCEDURE — 1200000000 HC SEMI PRIVATE

## 2019-06-10 PROCEDURE — 84484 ASSAY OF TROPONIN QUANT: CPT

## 2019-06-10 RX ORDER — FUROSEMIDE 10 MG/ML
20 INJECTION INTRAMUSCULAR; INTRAVENOUS ONCE
Status: COMPLETED | OUTPATIENT
Start: 2019-06-10 | End: 2019-06-10

## 2019-06-10 RX ORDER — LORATADINE AND PSEUDOEPHEDRINE 10; 240 MG/1; MG/1
1 TABLET, EXTENDED RELEASE ORAL DAILY
Status: DISCONTINUED | OUTPATIENT
Start: 2019-06-11 | End: 2019-06-11 | Stop reason: CLARIF

## 2019-06-10 RX ORDER — PHENAZOPYRIDINE HYDROCHLORIDE 100 MG/1
200 TABLET, FILM COATED ORAL
Status: DISCONTINUED | OUTPATIENT
Start: 2019-06-11 | End: 2019-06-14 | Stop reason: HOSPADM

## 2019-06-10 RX ORDER — SENNA PLUS 8.6 MG/1
1 TABLET ORAL EVERY OTHER DAY
Status: DISCONTINUED | OUTPATIENT
Start: 2019-06-11 | End: 2019-06-11 | Stop reason: ALTCHOICE

## 2019-06-10 RX ORDER — ONDANSETRON 2 MG/ML
4 INJECTION INTRAMUSCULAR; INTRAVENOUS EVERY 6 HOURS PRN
Status: DISCONTINUED | OUTPATIENT
Start: 2019-06-10 | End: 2019-06-14 | Stop reason: HOSPADM

## 2019-06-10 RX ORDER — TRAMADOL HYDROCHLORIDE 50 MG/1
50 TABLET ORAL EVERY 6 HOURS PRN
Status: ON HOLD | COMMUNITY
End: 2019-06-14 | Stop reason: SDUPTHER

## 2019-06-10 RX ORDER — BUDESONIDE 0.5 MG/2ML
0.5 INHALANT ORAL 2 TIMES DAILY
Status: DISCONTINUED | OUTPATIENT
Start: 2019-06-11 | End: 2019-06-14 | Stop reason: HOSPADM

## 2019-06-10 RX ORDER — PANTOPRAZOLE SODIUM 40 MG/1
40 TABLET, DELAYED RELEASE ORAL
Status: DISCONTINUED | OUTPATIENT
Start: 2019-06-11 | End: 2019-06-14 | Stop reason: HOSPADM

## 2019-06-10 RX ORDER — SODIUM CHLORIDE 0.9 % (FLUSH) 0.9 %
10 SYRINGE (ML) INJECTION EVERY 12 HOURS SCHEDULED
Status: DISCONTINUED | OUTPATIENT
Start: 2019-06-10 | End: 2019-06-14 | Stop reason: HOSPADM

## 2019-06-10 RX ORDER — OXYCODONE HYDROCHLORIDE 5 MG/1
10 TABLET ORAL ONCE
Status: DISCONTINUED | OUTPATIENT
Start: 2019-06-10 | End: 2019-06-10 | Stop reason: HOSPADM

## 2019-06-10 RX ORDER — FLUOXETINE HYDROCHLORIDE 20 MG/1
40 CAPSULE ORAL DAILY
Status: DISCONTINUED | OUTPATIENT
Start: 2019-06-11 | End: 2019-06-14 | Stop reason: HOSPADM

## 2019-06-10 RX ORDER — TRAMADOL HYDROCHLORIDE 50 MG/1
50 TABLET ORAL EVERY 6 HOURS PRN
Status: DISCONTINUED | OUTPATIENT
Start: 2019-06-10 | End: 2019-06-14 | Stop reason: HOSPADM

## 2019-06-10 RX ORDER — NYSTATIN 500000 [USP'U]/1
500000 TABLET, COATED ORAL 2 TIMES DAILY
Status: DISCONTINUED | OUTPATIENT
Start: 2019-06-10 | End: 2019-06-11 | Stop reason: DRUGHIGH

## 2019-06-10 RX ORDER — FLUTICASONE PROPIONATE 110 UG/1
1 AEROSOL, METERED RESPIRATORY (INHALATION) 2 TIMES DAILY
Status: DISCONTINUED | OUTPATIENT
Start: 2019-06-10 | End: 2019-06-10 | Stop reason: CLARIF

## 2019-06-10 RX ORDER — LACTULOSE 10 G/15ML
30 SOLUTION ORAL 2 TIMES DAILY
Status: DISCONTINUED | OUTPATIENT
Start: 2019-06-10 | End: 2019-06-11

## 2019-06-10 RX ORDER — METOPROLOL SUCCINATE 25 MG/1
25 TABLET, EXTENDED RELEASE ORAL DAILY
Status: DISCONTINUED | OUTPATIENT
Start: 2019-06-11 | End: 2019-06-14 | Stop reason: HOSPADM

## 2019-06-10 RX ORDER — M-VIT,TX,IRON,MINS/CALC/FOLIC 27MG-0.4MG
1 TABLET ORAL DAILY
Status: DISCONTINUED | OUTPATIENT
Start: 2019-06-11 | End: 2019-06-14 | Stop reason: HOSPADM

## 2019-06-10 RX ORDER — FLUTICASONE PROPIONATE 50 MCG
1 SPRAY, SUSPENSION (ML) NASAL DAILY
Status: DISCONTINUED | OUTPATIENT
Start: 2019-06-11 | End: 2019-06-14 | Stop reason: HOSPADM

## 2019-06-10 RX ORDER — TRIAMCINOLONE ACETONIDE 55 UG/1
2 SPRAY, METERED NASAL DAILY
COMMUNITY

## 2019-06-10 RX ORDER — NYSTATIN 500000 [USP'U]/1
500000 TABLET, COATED ORAL 2 TIMES DAILY
Status: ON HOLD | COMMUNITY
End: 2019-10-03 | Stop reason: HOSPADM

## 2019-06-10 RX ORDER — SODIUM CHLORIDE 9 MG/ML
INJECTION, SOLUTION INTRAVENOUS CONTINUOUS
Status: DISCONTINUED | OUTPATIENT
Start: 2019-06-10 | End: 2019-06-14 | Stop reason: HOSPADM

## 2019-06-10 RX ORDER — FERROUS SULFATE 325(65) MG
325 TABLET ORAL 2 TIMES DAILY
Status: DISCONTINUED | OUTPATIENT
Start: 2019-06-10 | End: 2019-06-14 | Stop reason: HOSPADM

## 2019-06-10 RX ORDER — SODIUM CHLORIDE 0.9 % (FLUSH) 0.9 %
10 SYRINGE (ML) INJECTION PRN
Status: DISCONTINUED | OUTPATIENT
Start: 2019-06-10 | End: 2019-06-14 | Stop reason: HOSPADM

## 2019-06-10 RX ORDER — FLUOXETINE HYDROCHLORIDE 20 MG/1
40 CAPSULE ORAL DAILY
COMMUNITY

## 2019-06-10 RX ORDER — EZETIMIBE 10 MG/1
10 TABLET ORAL NIGHTLY
Status: DISCONTINUED | OUTPATIENT
Start: 2019-06-10 | End: 2019-06-10 | Stop reason: RX

## 2019-06-10 RX ORDER — ALBUTEROL SULFATE 90 UG/1
2 AEROSOL, METERED RESPIRATORY (INHALATION) EVERY 4 HOURS PRN
Status: DISCONTINUED | OUTPATIENT
Start: 2019-06-10 | End: 2019-06-14 | Stop reason: HOSPADM

## 2019-06-10 RX ORDER — ASPIRIN 81 MG/1
81 TABLET, CHEWABLE ORAL DAILY
Status: DISCONTINUED | OUTPATIENT
Start: 2019-06-11 | End: 2019-06-11 | Stop reason: ALTCHOICE

## 2019-06-10 RX ORDER — M-VIT,TX,IRON,MINS/CALC/FOLIC 27MG-0.4MG
1 TABLET ORAL DAILY
COMMUNITY

## 2019-06-10 RX ORDER — LACTULOSE 10 G/15ML
20 SOLUTION ORAL 2 TIMES DAILY
COMMUNITY

## 2019-06-10 RX ADMIN — IOPAMIDOL 75 ML: 755 INJECTION, SOLUTION INTRAVENOUS at 19:45

## 2019-06-10 RX ADMIN — TAZOBACTAM SODIUM AND PIPERACILLIN SODIUM 3.38 G: 375; 3 INJECTION, SOLUTION INTRAVENOUS at 21:51

## 2019-06-10 RX ADMIN — FUROSEMIDE 20 MG: 10 INJECTION, SOLUTION INTRAMUSCULAR; INTRAVENOUS at 22:01

## 2019-06-10 RX ADMIN — TRAMADOL HYDROCHLORIDE 50 MG: 50 TABLET, FILM COATED ORAL at 21:56

## 2019-06-10 ASSESSMENT — PAIN SCALES - GENERAL
PAINLEVEL_OUTOF10: 7
PAINLEVEL_OUTOF10: 3
PAINLEVEL_OUTOF10: 9

## 2019-06-10 ASSESSMENT — PAIN DESCRIPTION - LOCATION: LOCATION: SHOULDER

## 2019-06-10 ASSESSMENT — PAIN DESCRIPTION - ORIENTATION: ORIENTATION: LEFT

## 2019-06-10 ASSESSMENT — PAIN DESCRIPTION - PAIN TYPE: TYPE: ACUTE PAIN

## 2019-06-10 NOTE — ED PROVIDER NOTES
[facility] Emergency Department    CHIEF COMPLAINT  Chief Complaint   Patient presents with    Abnormal Lab     sent from GI doctor with report of abnormal lab results. Pt appears Jaundice at triage. HISTORY OF PRESENT ILLNESS  Vandana Fields is a 59 y.o. female  who presents to the ED complaining of worsening jaundice. She has an increased amount of confusion as well per family. She currently resides at [facility]. This is due to orthopedic rehab from recent surgeries done at [facility].  She has not had fevers. She denies any acute abdominal pain although does have some chronic cramping. She does have a history of cirrhosis and hepatitis a in the past per chart review. The patient denies any alcohol abuse. She reports that she previously was on opiate pain medication but now she only takes Tylenol but only takes 1 or 2 tablets a day. C/o diffuse edema worsening as well. Has hx of UTI, denies dysuria to me. Family notes she struggles with compliance with medications. No other complaints, modifying factors or associated symptoms. I have reviewed the following from the nursing documentation.     Past Medical History:   Diagnosis Date    Anemia     iron deficiency    Emphysema of lung (HCC)     GERD (gastroesophageal reflux disease)     Hepatitis     Hepatitis A    Hyperlipidemia     PCOS (polycystic ovarian syndrome)     insulin resistance    Sinusitis     SVT (supraventricular tachycardia) ([facility] [address]      Past Surgical History:   Procedure Laterality Date     SECTION      CHOLECYSTECTOMY      COLONOSCOPY N/A 2018    COLONOSCOPY POLYPECTOMY SNARE/COLD BIOPSY performed by Genaro Miles MD at [address] [facility]  2018    COLONOSCOPY CONTROL HEMORRHAGE performed by Genaro Miles MD at [address]      x2    TOTAL HIP ARTHROPLASTY Right     TUBAL LIGATION      UPPER GASTROINTESTINAL ENDOSCOPY N/A 11/2/2018    EGD BIOPSY performed by Adonis Dominguez MD at [address] [place] [address]     No family history on file. Social History     Socioeconomic History    Marital status:       Spouse name: Not on file    Number of children: Not on file    Years of education: Not on file    Highest education level: Not on file   Occupational History    Not on file   Social Needs    Financial resource strain: Not on file    Food insecurity:     Worry: Not on file     Inability: Not on file    Transportation needs:     Medical: Not on file     Non-medical: Not on file   Tobacco Use    Smoking status: Current Every Day Smoker     Packs/day: 0.50     Years: 48.00     Pack years: 24.00    Smokeless tobacco: Never Used   Substance and Sexual Activity    Alcohol use: No    Drug use: No    Sexual activity: Not on file   Lifestyle    Physical activity:     Days per week: Not on file     Minutes per session: Not on file    Stress: Not on file   Relationships    Social connections:     Talks on phone: Not on file     Gets together: Not on file     Attends Christian service: Not on file     Active member of club or organization: Not on file     Attends meetings of clubs or organizations: Not on file     Relationship status: Not on file    Intimate partner violence:     Fear of current or ex partner: Not on file     Emotionally abused: Not on file     Physically abused: Not on file     Forced sexual activity: Not on file   Other Topics Concern    Not on file   Social History Narrative    Not on file     Current Facility-Administered Medications   Medication Dose Route Frequency Provider Last Rate Last Dose    oxyCODONE (ROXICODONE) immediate release tablet 10 mg  10 mg Oral Once Padilla Toth MD        piperacillin-tazobactam (ZOSYN) 3.375 g in dextrose 50 mL IVPB (premix)  3.375 g Intravenous Once Padilla Toth MD        furosemide (LASIX) injection 20 mg  20 mg Intravenous Once Kimberly Wasserman MD         Current Outpatient Medications   Medication Sig Dispense Refill    beclomethasone (QVAR) 80 MCG/ACT inhaler Inhale 1 puff into the lungs 2 times daily      enoxaparin (LOVENOX) 40 MG/0.4ML injection Inject 40 mg into the skin daily      FLUoxetine (PROZAC) 20 MG capsule Take 40 mg by mouth daily      lactulose (CHRONULAC) 10 GM/15ML solution Take 20 g by mouth 2 times daily      Multiple Vitamins-Minerals (THERAPEUTIC MULTIVITAMIN-MINERALS) tablet Take 1 tablet by mouth daily      triamcinolone (NASACORT ALLERGY 24HR) 55 MCG/ACT nasal inhaler 2 sprays by Each Nostril route daily      nystatin (MYCOSTATIN) 814311 units TABS Take 500,000 Units by mouth 2 times daily      rifaximin (XIFAXAN) 550 MG tablet Take 550 mg by mouth 2 times daily      traMADol (ULTRAM) 50 MG tablet Take 50 mg by mouth every 6 hours as needed for Pain.  ALBUTEROL IN Inhale into the lungs as needed      omeprazole (PRILOSEC) 10 MG delayed release capsule Take 20 mg by mouth daily      Loratadine-Pseudoephedrine (CLARITIN-D 24 HOUR PO) Take by mouth daily      ferrous sulfate 325 (65 Fe) MG tablet Take 325 mg by mouth 2 times daily      Metoprolol Succinate 25 MG CS24 Take 25 mg by mouth daily       metFORMIN (GLUCOPHAGE) 500 MG tablet Take 1,000 mg by mouth nightly      ezetimibe (ZETIA) 10 MG tablet Take 10 mg by mouth nightly      aspirin 81 MG tablet Take 81 mg by mouth daily      senna (SENOKOT) 8.6 MG tablet Take 1 tablet by mouth every other day       Allergies   Allergen Reactions    Adhesive Tape      Use paper tape       REVIEW OF SYSTEMS  10 systems reviewed, pertinent positives per HPI otherwise noted to be negative. PHYSICAL EXAM  BP (!) 138/56   Pulse 72   Temp 98.6 °F (37 °C)   Resp 16   Ht 5' 4\" (1.626 m)   Wt 220 lb (99.8 kg)   SpO2 92%   BMI 37.76 kg/m²    GENERAL APPEARANCE: Awake and alert. Cooperative. No distress. HENT: Normocephalic. Atraumatic.  Mucous membranes are dry. NECK: Supple. EYES: PERRL. EOM's grossly intact. Scleral icterus. HEART/CHEST: RRR. No murmurs. No chest wall tenderness. LUNGS: Respirations unlabored. Bibasilar rhonchi/rales, no wheezing. Good air exchange. Speaking comfortably in full sentences. ABDOMEN: No tenderness. Soft. Non-distended. No masses. No organomegaly. No guarding or rebound. Normal bowel sounds throughout. MUSCULOSKELETAL: 4+ bilateral lower extremity edema. Compartments soft. No deformity. No tenderness in the extremities. All extremities neurovascularly intact. SKIN: Warm and dry. No acute rashes. NEUROLOGICAL: Alert and oriented but confused on some questioning and repetitive/tangential. CN's 2-12 intact. No gross facial drooping. Strength 5/5, sensation intact. 2 plus DTR's in knees bilaterally. Gait not assessed. PSYCHIATRIC: Normal mood and affect. LABS  I have reviewed all labs for this visit.    Results for orders placed or performed during the hospital encounter of 06/10/19   CBC auto differential   Result Value Ref Range    WBC 4.2 4.0 - 11.0 K/uL    RBC 3.08 (L) 4.00 - 5.20 M/uL    Hemoglobin 10.7 (L) 12.0 - 16.0 g/dL    Hematocrit 32.9 (L) 36.0 - 48.0 %    .8 (H) 80.0 - 100.0 fL    MCH 34.8 (H) 26.0 - 34.0 pg    MCHC 32.6 31.0 - 36.0 g/dL    RDW 20.7 (H) 12.4 - 15.4 %    Platelets 608 995 - 246 K/uL    MPV 7.7 5.0 - 10.5 fL    Neutrophils % 46.9 %    Lymphocytes % 31.4 %    Monocytes % 13.4 %    Eosinophils % 7.2 %    Basophils % 1.1 %    Neutrophils # 2.0 1.7 - 7.7 K/uL    Lymphocytes # 1.3 1.0 - 5.1 K/uL    Monocytes # 0.6 0.0 - 1.3 K/uL    Eosinophils # 0.3 0.0 - 0.6 K/uL    Basophils # 0.0 0.0 - 0.2 K/uL   Basic Metabolic Panel w/ Reflex to MG   Result Value Ref Range    Sodium 139 136 - 145 mmol/L    Potassium reflex Magnesium 3.8 3.5 - 5.1 mmol/L    Chloride 103 99 - 110 mmol/L    CO2 27 21 - 32 mmol/L    Anion Gap 9 3 - 16    Glucose 96 70 - 99 mg/dL    BUN 5 (L) 7 - 20 mg/dL Interval 116 ms    QRS Duration 88 ms    Q-T Interval 440 ms    QTc Calculation (Bazett) 481 ms    P Axis 48 degrees    R Axis -2 degrees    T Axis 84 degrees    Diagnosis       Normal sinus rhythmSeptal infarct , age undeterminedT wave abnormality, consider anterior ischemia     The 12 lead EKG was interpreted by me as follows:  Rate: normal with a rate of 72  Rhythm: sinus  Axis: normal  Intervals: normal CO, narrow QRS, normal QTc  ST segments: no ST elevations or depressions  T waves: flattened anteriorly/subtle inversions  Non-specific T wave changes: present  Prior EKG comparison: No prior is currently available for comparison    RADIOLOGY    Xr Chest Standard (2 Vw)    Result Date: 6/10/2019  EXAMINATION: TWO XRAY VIEWS OF THE CHEST 6/10/2019 6:34 pm COMPARISON: 01/27/2014 HISTORY: ORDERING SYSTEM PROVIDED HISTORY: jacek TECHNOLOGIST PROVIDED HISTORY: Reason for exam:->anasarca Ordering Physician Provided Reason for Exam: sent from GI doctor with report of abnormal lab results. Pt appears Jaundice at triage. Acuity: Acute Type of Exam: Initial FINDINGS: Heart size normal.  The upper lobe vessels are prominent. There is haziness of the lungs which is in part due to overlying soft tissues. There is strandy opacity in the right lung base. There blunting of the posterior costophrenic angles     Pulmonary vascular congestion with small posterior pleural effusions and right basilar atelectasis     Ct Head Wo Contrast    Result Date: 6/10/2019  EXAMINATION: CT OF THE HEAD WITHOUT CONTRAST  6/10/2019 4:33 pm TECHNIQUE: CT of the head was performed without the administration of intravenous contrast. Dose modulation, iterative reconstruction, and/or weight based adjustment of the mA/kV was utilized to reduce the radiation dose to as low as reasonably achievable. COMPARISON: None.  HISTORY: ORDERING SYSTEM PROVIDED HISTORY: confusion TECHNOLOGIST PROVIDED HISTORY: Has a \"code stroke\" or \"stroke alert\" been called? ->No Ordering Physician Provided Reason for Exam: confusion Acuity: Acute Type of Exam: Initial Relevant Medical/Surgical History: Abnormal Lab (sent from GI doctor with report of abnormal lab results. Pt appears Jaundice at triage. ) FINDINGS: BRAIN/VENTRICLES: There is no acute intracranial hemorrhage, mass effect or midline shift. No abnormal extra-axial fluid collection. The gray-white differentiation is maintained without evidence of an acute infarct. There is prominence of the ventricles and sulci due to global parenchymal volume loss. There are nonspecific areas of hypoattenuation within the periventricular and subcortical white matter, which likely represent chronic microvascular ischemic change. ORBITS: The visualized portion of the orbits demonstrate no acute abnormality. SINUSES: The visualized paranasal sinuses and mastoid air cells demonstrate no acute abnormality. SOFT TISSUES/SKULL: No acute abnormality of the visualized skull or soft tissues. No acute intracranial abnormality. Ct Abdomen Pelvis W Iv Contrast Additional Contrast? None    Result Date: 6/10/2019  EXAMINATION: CT OF THE ABDOMEN AND PELVIS WITH CONTRAST 6/10/2019 7:43 pm TECHNIQUE: CT of the abdomen and pelvis was performed with the administration of intravenous contrast. Multiplanar reformatted images are provided for review. Dose modulation, iterative reconstruction, and/or weight based adjustment of the mA/kV was utilized to reduce the radiation dose to as low as reasonably achievable. COMPARISON: None. HISTORY: ORDERING SYSTEM PROVIDED HISTORY: anasarca jaundice TECHNOLOGIST PROVIDED HISTORY: If patient is on cardiac monitor and/or pulse ox, they may be taken off cardiac monitor and pulse ox, left on O2 if currently on. All monitors reattached when patient returns to room.  Additional Contrast?->None Ordering Physician Provided Reason for Exam: anasarca jaundice Acuity: Unknown Type of Exam: Unknown FINDINGS: Lower Chest: Small bilateral pleural effusions. Multifocal ground-glass infiltrates in the lung bases. Compressive atelectasis in the right lower. Esophageal varices Organs: Shrunken nodular liver. Enlarged spleen. Kidneys, adrenals, pancreas unremarkable. Gallbladder surgically absent. GI/Bowel: No intrinsic gastrointestinal abnormality demonstrated. Pelvis: Streak artifact from right hip prosthesis. Urinary bladder grossly unremarkable. Reproductive organs within normal limits Peritoneum/Retroperitoneum: Small amount of ascites. No pneumoperitoneum. Large collateral vessels in the left upper quadrant. Bones/Soft Tissues: No acute bony abnormality. Body wall edema     1. Cirrhotic liver with findings of portal hypertension including splenomegaly and small ascites as well as esophageal varices and collateral vessels in the upper abdomen. 2. Generalized edema state, including anasarca and pleural effusions. Ground-glass opacities in the lung bases may represent edema or pneumonia     ED COURSE/MDM  Patient seen and evaluated. Old records reviewed. Labs and imaging reviewed and results discussed with patient. After initial evaluation, differential diagnostic considerations included: stroke, TIA, intracranial bleed, trauma, infection/sepsis, medication side effect, intoxication/withdrawal, metabolic/electrolyte abnormalities    The patient's ED workup was notable for hyperbilirubinemia, urinary tract infection but no hyperammonemia at this point. Though lactate is 2.1 I do not suspect severe sepsis given lack of leukocytosis, fever, SIRS criteria, etc.  HCT is stable. CT abd pelvis shows no acute findings but cirrhosis sequelae and anasarca findings are present. Clinically pneumonia is unlikely. Given gentle Lasix, Zosyn, and admit.     SEP-1 CORE MEASURE DATA    Classification: exclude from core measure    Exclusion criteria: the patient is NOT to be included for sepsis due to:  SIRS criteria are not met, transcribed correctly.        Sonya Irving MD  06/10/19 [id number]

## 2019-06-10 NOTE — ED NOTES
Bed: 24  Expected date:   Expected time:   Means of arrival:   Comments:  [facility] Pt-poor liver function     Papa Khan RN  06/10/19 9028

## 2019-06-11 PROBLEM — K74.60 CIRRHOSIS (HCC): Status: ACTIVE | Noted: 2019-06-11

## 2019-06-11 PROBLEM — E11.9 DM2 (DIABETES MELLITUS, TYPE 2) (HCC): Status: ACTIVE | Noted: 2019-06-11

## 2019-06-11 PROBLEM — K21.9 GERD (GASTROESOPHAGEAL REFLUX DISEASE): Status: ACTIVE | Noted: 2019-06-11

## 2019-06-11 PROBLEM — K76.82 HEPATIC ENCEPHALOPATHY: Status: ACTIVE | Noted: 2019-06-11

## 2019-06-11 PROBLEM — E87.6 HYPOKALEMIA: Status: ACTIVE | Noted: 2019-06-11

## 2019-06-11 LAB
A/G RATIO: 0.6 (ref 1.1–2.2)
ALBUMIN SERPL-MCNC: 2.3 G/DL (ref 3.4–5)
ALP BLD-CCNC: 173 U/L (ref 40–129)
ALT SERPL-CCNC: 18 U/L (ref 10–40)
AMMONIA: 73 UMOL/L (ref 11–51)
ANION GAP SERPL CALCULATED.3IONS-SCNC: 9 MMOL/L (ref 3–16)
AST SERPL-CCNC: 38 U/L (ref 15–37)
BASOPHILS ABSOLUTE: 0 K/UL (ref 0–0.2)
BASOPHILS RELATIVE PERCENT: 0.8 %
BILIRUB SERPL-MCNC: 4.5 MG/DL (ref 0–1)
BILIRUBIN DIRECT: 1.7 MG/DL (ref 0–0.3)
BILIRUBIN, INDIRECT: 2.8 MG/DL (ref 0–1)
BUN BLDV-MCNC: 5 MG/DL (ref 7–20)
CALCIUM SERPL-MCNC: 8.3 MG/DL (ref 8.3–10.6)
CHLORIDE BLD-SCNC: 102 MMOL/L (ref 99–110)
CO2: 28 MMOL/L (ref 21–32)
CREAT SERPL-MCNC: <0.5 MG/DL (ref 0.6–1.2)
EKG ATRIAL RATE: 72 BPM
EKG DIAGNOSIS: NORMAL
EKG P AXIS: 48 DEGREES
EKG P-R INTERVAL: 116 MS
EKG Q-T INTERVAL: 440 MS
EKG QRS DURATION: 88 MS
EKG QTC CALCULATION (BAZETT): 481 MS
EKG R AXIS: -2 DEGREES
EKG T AXIS: 84 DEGREES
EKG VENTRICULAR RATE: 72 BPM
EOSINOPHILS ABSOLUTE: 0.3 K/UL (ref 0–0.6)
EOSINOPHILS RELATIVE PERCENT: 7 %
ESTIMATED AVERAGE GLUCOSE: 56.6 MG/DL
GFR AFRICAN AMERICAN: >60
GFR NON-AFRICAN AMERICAN: >60
GLOBULIN: 3.6 G/DL
GLUCOSE BLD-MCNC: 105 MG/DL (ref 70–99)
GLUCOSE BLD-MCNC: 119 MG/DL (ref 70–99)
GLUCOSE BLD-MCNC: 124 MG/DL (ref 70–99)
GLUCOSE BLD-MCNC: 85 MG/DL (ref 70–99)
GLUCOSE BLD-MCNC: 91 MG/DL (ref 70–99)
HBA1C MFR BLD: 3.6 %
HCT VFR BLD CALC: 30.4 % (ref 36–48)
HEMOGLOBIN: 9.9 G/DL (ref 12–16)
LACTIC ACID: 1.9 MMOL/L (ref 0.4–2)
LYMPHOCYTES ABSOLUTE: 1.1 K/UL (ref 1–5.1)
LYMPHOCYTES RELATIVE PERCENT: 25.4 %
MAGNESIUM: 1.7 MG/DL (ref 1.8–2.4)
MCH RBC QN AUTO: 34.6 PG (ref 26–34)
MCHC RBC AUTO-ENTMCNC: 32.5 G/DL (ref 31–36)
MCV RBC AUTO: 106.7 FL (ref 80–100)
MONOCYTES ABSOLUTE: 0.6 K/UL (ref 0–1.3)
MONOCYTES RELATIVE PERCENT: 15 %
NEUTROPHILS ABSOLUTE: 2.2 K/UL (ref 1.7–7.7)
NEUTROPHILS RELATIVE PERCENT: 51.8 %
PDW BLD-RTO: 20.6 % (ref 12.4–15.4)
PERFORMED ON: ABNORMAL
PERFORMED ON: NORMAL
PLATELET # BLD: 135 K/UL (ref 135–450)
PMV BLD AUTO: 7.2 FL (ref 5–10.5)
POTASSIUM REFLEX MAGNESIUM: 3.3 MMOL/L (ref 3.5–5.1)
RBC # BLD: 2.85 M/UL (ref 4–5.2)
SODIUM BLD-SCNC: 139 MMOL/L (ref 136–145)
TOTAL PROTEIN: 5.9 G/DL (ref 6.4–8.2)
WBC # BLD: 4.2 K/UL (ref 4–11)

## 2019-06-11 PROCEDURE — 82140 ASSAY OF AMMONIA: CPT

## 2019-06-11 PROCEDURE — 85025 COMPLETE CBC W/AUTO DIFF WBC: CPT

## 2019-06-11 PROCEDURE — 94640 AIRWAY INHALATION TREATMENT: CPT

## 2019-06-11 PROCEDURE — 2580000003 HC RX 258: Performed by: INTERNAL MEDICINE

## 2019-06-11 PROCEDURE — 83735 ASSAY OF MAGNESIUM: CPT

## 2019-06-11 PROCEDURE — 6360000002 HC RX W HCPCS: Performed by: INTERNAL MEDICINE

## 2019-06-11 PROCEDURE — 6370000000 HC RX 637 (ALT 250 FOR IP): Performed by: INTERNAL MEDICINE

## 2019-06-11 PROCEDURE — 93010 ELECTROCARDIOGRAM REPORT: CPT | Performed by: INTERNAL MEDICINE

## 2019-06-11 PROCEDURE — 36415 COLL VENOUS BLD VENIPUNCTURE: CPT

## 2019-06-11 PROCEDURE — 83036 HEMOGLOBIN GLYCOSYLATED A1C: CPT

## 2019-06-11 PROCEDURE — 94761 N-INVAS EAR/PLS OXIMETRY MLT: CPT

## 2019-06-11 PROCEDURE — 1200000000 HC SEMI PRIVATE

## 2019-06-11 PROCEDURE — 80053 COMPREHEN METABOLIC PANEL: CPT

## 2019-06-11 RX ORDER — DEXTROSE MONOHYDRATE 50 MG/ML
100 INJECTION, SOLUTION INTRAVENOUS PRN
Status: DISCONTINUED | OUTPATIENT
Start: 2019-06-11 | End: 2019-06-14 | Stop reason: HOSPADM

## 2019-06-11 RX ORDER — POTASSIUM CHLORIDE 7.45 MG/ML
10 INJECTION INTRAVENOUS PRN
Status: DISCONTINUED | OUTPATIENT
Start: 2019-06-11 | End: 2019-06-14 | Stop reason: HOSPADM

## 2019-06-11 RX ORDER — SPIRONOLACTONE 25 MG/1
100 TABLET ORAL DAILY
Status: DISCONTINUED | OUTPATIENT
Start: 2019-06-11 | End: 2019-06-14 | Stop reason: HOSPADM

## 2019-06-11 RX ORDER — POTASSIUM CHLORIDE 20 MEQ/1
40 TABLET, EXTENDED RELEASE ORAL PRN
Status: DISCONTINUED | OUTPATIENT
Start: 2019-06-11 | End: 2019-06-14 | Stop reason: HOSPADM

## 2019-06-11 RX ORDER — HYDRALAZINE HYDROCHLORIDE 20 MG/ML
10 INJECTION INTRAMUSCULAR; INTRAVENOUS EVERY 6 HOURS PRN
Status: DISCONTINUED | OUTPATIENT
Start: 2019-06-11 | End: 2019-06-14 | Stop reason: HOSPADM

## 2019-06-11 RX ORDER — DEXTROSE MONOHYDRATE 25 G/50ML
12.5 INJECTION, SOLUTION INTRAVENOUS PRN
Status: DISCONTINUED | OUTPATIENT
Start: 2019-06-11 | End: 2019-06-14 | Stop reason: HOSPADM

## 2019-06-11 RX ORDER — LACTULOSE 10 G/15ML
20 SOLUTION ORAL 3 TIMES DAILY
Status: DISCONTINUED | OUTPATIENT
Start: 2019-06-11 | End: 2019-06-11

## 2019-06-11 RX ORDER — FUROSEMIDE 40 MG/1
40 TABLET ORAL DAILY
Status: DISCONTINUED | OUTPATIENT
Start: 2019-06-11 | End: 2019-06-14 | Stop reason: HOSPADM

## 2019-06-11 RX ORDER — PSEUDOEPHEDRINE HCL 120 MG/1
240 TABLET, FILM COATED, EXTENDED RELEASE ORAL DAILY
Status: DISCONTINUED | OUTPATIENT
Start: 2019-06-11 | End: 2019-06-14 | Stop reason: HOSPADM

## 2019-06-11 RX ORDER — NICOTINE POLACRILEX 4 MG
15 LOZENGE BUCCAL PRN
Status: DISCONTINUED | OUTPATIENT
Start: 2019-06-11 | End: 2019-06-14 | Stop reason: HOSPADM

## 2019-06-11 RX ORDER — 0.9 % SODIUM CHLORIDE 0.9 %
500 INTRAVENOUS SOLUTION INTRAVENOUS PRN
Status: DISCONTINUED | OUTPATIENT
Start: 2019-06-11 | End: 2019-06-14 | Stop reason: HOSPADM

## 2019-06-11 RX ORDER — LACTULOSE 10 G/15ML
10 SOLUTION ORAL 3 TIMES DAILY
Status: DISCONTINUED | OUTPATIENT
Start: 2019-06-11 | End: 2019-06-12

## 2019-06-11 RX ORDER — MAGNESIUM SULFATE 1 G/100ML
1 INJECTION INTRAVENOUS ONCE
Status: COMPLETED | OUTPATIENT
Start: 2019-06-11 | End: 2019-06-11

## 2019-06-11 RX ORDER — CETIRIZINE HYDROCHLORIDE 10 MG/1
10 TABLET ORAL DAILY
Status: DISCONTINUED | OUTPATIENT
Start: 2019-06-11 | End: 2019-06-14 | Stop reason: HOSPADM

## 2019-06-11 RX ADMIN — CETIRIZINE HYDROCHLORIDE 10 MG: 10 TABLET, FILM COATED ORAL at 08:44

## 2019-06-11 RX ADMIN — RIFAXIMIN 550 MG: 550 TABLET ORAL at 20:33

## 2019-06-11 RX ADMIN — FERROUS SULFATE TAB 325 MG (65 MG ELEMENTAL FE) 325 MG: 325 (65 FE) TAB at 03:44

## 2019-06-11 RX ADMIN — LACTULOSE 30 G: 20 SOLUTION ORAL at 08:45

## 2019-06-11 RX ADMIN — FERROUS SULFATE TAB 325 MG (65 MG ELEMENTAL FE) 325 MG: 325 (65 FE) TAB at 08:44

## 2019-06-11 RX ADMIN — MAGNESIUM SULFATE HEPTAHYDRATE 1 G: 1 INJECTION, SOLUTION INTRAVENOUS at 08:45

## 2019-06-11 RX ADMIN — BUDESONIDE 500 MCG: 0.5 SUSPENSION RESPIRATORY (INHALATION) at 08:57

## 2019-06-11 RX ADMIN — Medication 10 ML: at 08:45

## 2019-06-11 RX ADMIN — RIFAXIMIN 550 MG: 550 TABLET ORAL at 08:45

## 2019-06-11 RX ADMIN — METOPROLOL SUCCINATE 25 MG: 25 TABLET, FILM COATED, EXTENDED RELEASE ORAL at 08:44

## 2019-06-11 RX ADMIN — NYSTATIN 1500000 UNITS: 100000 SUSPENSION ORAL at 08:45

## 2019-06-11 RX ADMIN — FLUOXETINE 40 MG: 20 CAPSULE ORAL at 08:44

## 2019-06-11 RX ADMIN — METFORMIN HYDROCHLORIDE 1000 MG: 500 TABLET ORAL at 03:42

## 2019-06-11 RX ADMIN — PANTOPRAZOLE SODIUM 40 MG: 40 TABLET, DELAYED RELEASE ORAL at 03:44

## 2019-06-11 RX ADMIN — RIFAXIMIN 550 MG: 550 TABLET ORAL at 03:45

## 2019-06-11 RX ADMIN — BUDESONIDE 500 MCG: 0.5 SUSPENSION RESPIRATORY (INHALATION) at 20:43

## 2019-06-11 RX ADMIN — NYSTATIN 1500000 UNITS: 100000 SUSPENSION ORAL at 20:33

## 2019-06-11 RX ADMIN — METFORMIN HYDROCHLORIDE 1000 MG: 500 TABLET ORAL at 20:33

## 2019-06-11 RX ADMIN — ENOXAPARIN SODIUM 40 MG: 40 INJECTION SUBCUTANEOUS at 08:45

## 2019-06-11 RX ADMIN — SODIUM CHLORIDE: 9 INJECTION, SOLUTION INTRAVENOUS at 03:29

## 2019-06-11 RX ADMIN — Medication 10 ML: at 03:26

## 2019-06-11 RX ADMIN — Medication 1 G: at 03:30

## 2019-06-11 RX ADMIN — BUDESONIDE 500 MCG: 0.5 SUSPENSION RESPIRATORY (INHALATION) at 01:07

## 2019-06-11 RX ADMIN — PHENAZOPYRIDINE HYDROCHLORIDE 200 MG: 100 TABLET ORAL at 08:44

## 2019-06-11 RX ADMIN — LACTULOSE 10 G: 20 SOLUTION ORAL at 20:34

## 2019-06-11 RX ADMIN — Medication 1 G: at 23:12

## 2019-06-11 RX ADMIN — FERROUS SULFATE TAB 325 MG (65 MG ELEMENTAL FE) 325 MG: 325 (65 FE) TAB at 20:33

## 2019-06-11 ASSESSMENT — ENCOUNTER SYMPTOMS
EYE DISCHARGE: 0
CHEST TIGHTNESS: 0
SHORTNESS OF BREATH: 0
COLOR CHANGE: 1
NAUSEA: 0
EYE ITCHING: 0
VOMITING: 0

## 2019-06-11 ASSESSMENT — PAIN DESCRIPTION - LOCATION: LOCATION: SHOULDER

## 2019-06-11 ASSESSMENT — PAIN DESCRIPTION - PAIN TYPE: TYPE: ACUTE PAIN

## 2019-06-11 ASSESSMENT — PAIN DESCRIPTION - ORIENTATION: ORIENTATION: LEFT

## 2019-06-11 ASSESSMENT — PAIN SCALES - GENERAL
PAINLEVEL_OUTOF10: 0
PAINLEVEL_OUTOF10: 8

## 2019-06-11 NOTE — PLAN OF CARE
Patient's pain will decrease this shift. Patient verbalizes understanding on how to notify nursing staff when pain arises. Patient also verbalizes understanding on the use of pharmacologic and non-pharmacologic pain interventions. Patient will be free of injury this shift. Patient is encouraged to call for assistance prior to getting out of bed. Call light within reach, bed in lowest position and locked, and bed alarm engaged. Non-slip socks on both feet. Bedside table within reach. Room and floor are free of clutter. Patient will remain free of impaired skin integrity during this shift. Patient will change positions at least every 2 hours to prevent skin breakdown and tissue injury. Precautions will remain in effect to prevent patient from experiencing skin breakdown: appropriate mattress for patient, heels elevated off bed and/or heel protectors,  sacral border, and bony prominences elevated.

## 2019-06-11 NOTE — PROGRESS NOTES
Bedside report completed with Doug Rizo RN. Call light within reach, bed alarm engaged. Patient remains in bed sleeping. Respirations are easy and unlabored. Patient does not appear to be in distress.

## 2019-06-11 NOTE — PROGRESS NOTES
Rounding on patient. Patient is in bed watching TV. Respirations are easy and unlabored. Patient does not appear to be in distress. Call light within reach, bed alarm engaged. Will continue to monitor.

## 2019-06-11 NOTE — ED NOTES
Attempted Patel insertion, unsuccessful. Swelling noted to areas surrounding urethra.      Gabriele Santana RN  06/10/19 [address], RN  06/10/19 7233

## 2019-06-11 NOTE — H&P
History and Physical  Dr. Pito Haley  6/11/2019    PCP: Zohra Marin    Cc:   Chief Complaint   Patient presents with    Abnormal Lab     sent from GI doctor with report of abnormal lab results. Pt appears Jaundice at triage. HPI:  Bennett Jarrell is a 59 y.o. female who has a past medical history of Anemia, Emphysema of lung ([facility] GERD (gastroesophageal reflux disease), Hepatitis, Hyperlipidemia, PCOS (polycystic ovarian syndrome), Sinusitis, and SVT (supraventricular tachycardia) ([facility] Patient presents with jaundice and UTI (urinary tract infection). HPI of presenting complaint in blockformat: (1-3 for expanded problem focused, ?4 for detailed/comprehensive)   Location: jaundice to skin  Duration: for about a week  Timing:    Context:  59 y.o. female  who presents to the ED complaining of worsening jaundice. She has an increased amount of confusion as well per family. She currently resides at [facility]. This is due to orthopedic rehab from recent surgeries done at [facility].  She has not had fevers. She denies any acute abdominal pain although does have some chronic cramping. She does have a history of cirrhosis and hepatitis a in the past per chart review. The patient denies any alcohol abuse. She reports that she previously was on opiate pain medication but now she only takes Tylenol but only takes 1 or 2 tablets a day. C/o diffuse edema worsening as well. Has hx of UTI, denies dysuria to me. Family notes she struggles with compliance with medications. Severity: severe  Quality: yellow color to skin  Modifying Factors: nothing seems to make it better or worse. Associated Signs or Symptoms: denies f/c. Denies n/v         Problem list of hospitalization thus far:   Active Hospital Problems    Diagnosis    GERD (gastroesophageal reflux disease) [K21.9]    Hypokalemia [E87.6]    DM2 (diabetes mellitus, type 2) ([facility] [E11.9]    [facility] ([facility] [K72.90]    Cirrhosis ([facility] [K74.60]    UTI (urinary tract infection) [N39.0]         Review of Systems: (1 system for EPF, 2-9 for detailed, 10+ for comprehensive)  Review of Systems   Constitutional: Negative for chills and fever. HENT: Negative for ear pain and mouth sores. Eyes: Negative for discharge and itching. Respiratory: Negative for chest tightness and shortness of breath. Cardiovascular: Negative for chest pain and leg swelling. Gastrointestinal: Negative for nausea and vomiting. Endocrine: Negative for cold intolerance and heat intolerance. Genitourinary: Negative for frequency and urgency. Musculoskeletal: Positive for arthralgias and myalgias. Skin: Positive for color change. Allergic/Immunologic: Negative for food allergies. Neurological: Negative for tremors and numbness. Hematological: Negative for adenopathy. Psychiatric/Behavioral: Positive for dysphoric mood. The patient is not nervous/anxious and is not hyperactive.             Past Medical History:   Past Medical History:   Diagnosis Date    Anemia     iron deficiency    Emphysema of lung (HCC)     GERD (gastroesophageal reflux disease)     Hepatitis     Hepatitis A    Hyperlipidemia     PCOS (polycystic ovarian syndrome)     insulin resistance    Sinusitis     SVT (supraventricular tachycardia) ([facility]        Past Surgical History:   Past Surgical History:   Procedure Laterality Date     SECTION      CHOLECYSTECTOMY      COLONOSCOPY N/A 2018    COLONOSCOPY POLYPECTOMY SNARE/COLD BIOPSY performed by Cristino Shirley MD at [facility] COLONOSCOPY  2018    COLONOSCOPY CONTROL HEMORRHAGE performed by Cristino Shirley MD at [place] [address]    TOTAL HIP ARTHROPLASTY Right 2014    TUBAL LIGATION      UPPER GASTROINTESTINAL ENDOSCOPY N/A 2018    EGD BIOPSY performed by Cristino Shirley MD at [address] [facility] ENDOSCOPY       Social History:   Social History Tobacco History     Smoking Status  Current Every Day Smoker Smoking Frequency  0.5 packs/day for 48 years (24 pk yrs)    Smokeless Tobacco Use  Never Used          Alcohol History     Alcohol Use Status  No          Drug Use     Drug Use Status  No          Sexual Activity     Sexually Active  Not Asked                Fam History: History reviewed. No pertinent family history. PFSH: The above PMHx, PSHx, SocHx, FamHx has been reviewed by myself. (1 area for detailed, 2-3 for comprehensive)      Code Status: Full Code    Meds - following list of home medications fromelectronic chart has been reviewed by myself  Prior to Admission medications    Medication Sig Start Date End Date Taking? Authorizing Provider   beclomethasone (QVAR) 80 MCG/ACT inhaler Inhale 1 puff into the lungs 2 times daily    Historical Provider, MD   enoxaparin (LOVENOX) 40 MG/0.4ML injection Inject 40 mg into the skin daily    Historical Provider, MD   FLUoxetine (PROZAC) 20 MG capsule Take 40 mg by mouth daily    Historical Provider, MD   lactulose (CHRONULAC) 10 GM/15ML solution Take 20 g by mouth 2 times daily    Historical Provider, MD   Multiple Vitamins-Minerals (THERAPEUTIC MULTIVITAMIN-MINERALS) tablet Take 1 tablet by mouth daily    Historical Provider, MD   triamcinolone (NASACORT ALLERGY 24HR) 55 MCG/ACT nasal inhaler 2 sprays by Each Nostril route daily    Historical Provider, MD   nystatin (MYCOSTATIN) 475966 units TABS Take 500,000 Units by mouth 2 times daily    Historical Provider, MD   rifaximin (XIFAXAN) 550 MG tablet Take 550 mg by mouth 2 times daily    Historical Provider, MD   traMADol (ULTRAM) 50 MG tablet Take 50 mg by mouth every 6 hours as needed for Pain.     Historical Provider, MD   ALBUTEROL IN Inhale into the lungs as needed    Historical Provider, MD   omeprazole (PRILOSEC) 10 MG delayed release capsule Take 20 mg by mouth daily    Historical Provider, MD   Loratadine-Pseudoephedrine (CLARITIN-D 24 HOUR PO) Take by mouth daily    Historical Provider, MD   ferrous sulfate 325 (65 Fe) MG tablet Take 325 mg by mouth 2 times daily    Historical Provider, MD   Metoprolol Succinate 25 MG CS24 Take 25 mg by mouth daily     Historical Provider, MD   metFORMIN (GLUCOPHAGE) 500 MG tablet Take 1,000 mg by mouth nightly    Historical Provider, MD   ezetimibe (ZETIA) 10 MG tablet Take 10 mg by mouth nightly    Historical Provider, MD   aspirin 81 MG tablet Take 81 mg by mouth daily    Historical Provider, MD   senna (SENOKOT) 8.6 MG tablet Take 1 tablet by mouth every other day    Historical Provider, MD         Allergies   Allergen Reactions    Adhesive Tape      Use paper tape             EXAM: (2-7 system for EPF/Detailed, ?8 for Comprehensive)  /80   Pulse 77   Temp 97.9 °F (36.6 °C) (Oral)   Resp 18   Ht 5' 4\" (1.626 m)   Wt 220 lb (99.8 kg)   SpO2 93%   BMI 37.76 kg/m²   Constitutional: vitals as above: alert, appears stated age and cooperative  Psychiatric: normal insight and judgment, oriented to person, place, time, and general circumstances  Head: Normocephalic, without obvious abnormality, atraumatic  Eyes:lids and lashes normal, conjunctivae yellow and pupils equal, round, reactive to light and accomodation  EMNT: external ears normal.   Neck: no adenopathy, supple, symmetrical, trachea midline and thyroid not enlarged, symmetric, no tenderness/mass/nodules   Respiratory: clear to auscultation without wheezes or rales  Cardiovascular: normal rate, regular rhythm, normal S1 and S2 and no gallops  Gastrointestinal: soft, non-tender, non-distended, normal bowel sounds, no masses or organomegaly  Extremities: trace edema  Skin:+jaundice  Neurologic: no asterixis    LABS:  Labs Reviewed   CBC WITH AUTO DIFFERENTIAL - Abnormal; Notable for the following components:       Result Value    RBC 3.08 (*)     Hemoglobin 10.7 (*)     Hematocrit 32.9 (*)     .8 (*)     MCH 34.8 (*)     RDW 20.7 (*)     All other components within normal limits    Narrative:     Performed at:  [facility] [address] [employer]   Phone [phone]   BASIC METABOLIC PANEL W/ REFLEX TO MG FOR LOW K - Abnormal; Notable for the following components:    BUN 5 (*)     CREATININE <0.5 (*)     All other components within normal limits    Narrative:     Performed at:  [facility] [address] [employer]   Phone [phone]   HEPATIC FUNCTION PANEL - Abnormal; Notable for the following components:    Alb 2.5 (*)     Alkaline Phosphatase 194 (*)     AST 53 (*)     Total Bilirubin 4.8 (*)     Bilirubin, Direct 1.6 (*)     Bilirubin, Indirect 3.2 (*)     All other components within normal limits    Narrative:     Performed at:  [facility] [address] [employer]   Phone [phone]   PROTIME-INR - Abnormal; Notable for the following components:    Protime 19.1 (*)     INR 1.68 (*)     All other components within normal limits    Narrative:     Performed at:  [facility] [address] [employer]   Phone [phone]   URINE RT REFLEX TO CULTURE - Abnormal; Notable for the following components:    Clarity, UA TURBID (*)     Bilirubin Urine SMALL (*)     Ketones, Urine TRACE (*)     Blood, Urine TRACE (*)     Urobilinogen, Urine 4.0 (*)     Nitrite, Urine POSITIVE (*)     Leukocyte Esterase, Urine MODERATE (*)     All other components within normal limits    Narrative:     Performed at:  [facility] [address] [employer]   Phone [phone]   BRAIN NATRIURETIC PEPTIDE - Abnormal; Notable for the following components:    Pro-BNP 4,206 (*)     All other components within normal limits    Narrative:     Performed at:  [facility] [address] [employer]   Phone [phone]   MICROSCOPIC URINALYSIS - Abnormal; Notable for the following components:    Bacteria, UA 4+ (*)     Hyaline Casts, UA 17 (*)     WBC, UA 54 (*)     All other components within normal limits    Narrative:     Performed at:  [facility] [address] [place] [address] [address]   Phone [phone]   ACETAMINOPHEN LEVEL - Abnormal; Notable for the following components:    Acetaminophen Level <5 (*)     All other components within normal limits    Narrative:     Performed at:  [facility] [address] [place] [address] [address]   Phone [phone]   LACTIC ACID, PLASMA - Abnormal; Notable for the following components:    Lactic Acid 2.1 (*)     All other components within normal limits    Narrative:     Performed at:  [facility] [address], [place], [address] [place]   Phone [phone]   COMPREHENSIVE METABOLIC PANEL W/ REFLEX TO MG FOR LOW K - Abnormal; Notable for the following components:    Potassium reflex Magnesium 3.3 (*)     BUN 5 (*)     CREATININE <0.5 (*)     Total Protein 5.9 (*)     Alb 2.3 (*)     Albumin/Globulin Ratio 0.6 (*)     Total Bilirubin 4.5 (*)     Alkaline Phosphatase 173 (*)     AST 38 (*)     All other components within normal limits    Narrative:     Performed at:  [facility] [address], [place], [address]   Phone [phone]   CBC WITH AUTO DIFFERENTIAL - Abnormal; Notable for the following components:    RBC 2.85 (*)     Hemoglobin 9.9 (*)     Hematocrit 30.4 (*)     .7 (*)     MCH 34.6 (*)     RDW 20.6 (*)     All other components within normal limits    Narrative:     Performed at:  [facility] [address], [place], [address]   Phone [phone]   AMMONIA - Abnormal; Notable for the following components:    Ammonia 73 (*)     All other components within normal limits    Narrative: Performed at:  [facility] [address],  [place], [address] [employer]   Phone [phone]   HEPATIC FUNCTION PANEL - Abnormal; Notable for the following components:    Bilirubin, Direct 1.7 (*)     Bilirubin, Indirect 2.8 (*)     All other components within normal limits    Narrative:     Performed at:  [facility] [address]  [place], [address] [employer]   Phone [phone]   MAGNESIUM - Abnormal; Notable for the following components:    Magnesium 1.70 (*)     All other components within normal limits    Narrative:     Performed at:  [facility] [address],  [place], [address] [employer]   Phone [phone]   URINE CULTURE   CULTURE BLOOD #1   CULTURE BLOOD #2   URINE CULTURE   LIPASE    Narrative:     Performed at:  [facility] [address],  [place], [address] [employer]   Phone [phone]   AMMONIA    Narrative:     Performed at:  [facility] [address],  [place], [address] [employer]   Phone [phone]   TROPONIN    Narrative:     Performed at:  [facility] [address],  [place], [address] [employer]   Phone [phone]   LACTIC ACID, PLASMA    Narrative:     Performed at:  [facility] [address],  [place], [facility] [employer]   Phone [phone]   URINALYSIS         IMAGING:  Imaging results from the ER have been reviewed in the computerized chart. Xr Chest Standard (2 Vw)    Result Date: 6/10/2019  EXAMINATION: TWO XRAY VIEWS OF THE CHEST 6/10/2019 6:34 pm COMPARISON: 01/27/2014 HISTORY: ORDERING SYSTEM PROVIDED HISTORY: anasarca TECHNOLOGIST PROVIDED HISTORY: Reason for exam:->anasarca Ordering Physician Provided Reason for Exam: sent from GI doctor with report of abnormal lab results. Pt appears Jaundice at triage.  Acuity: Acute Type of Exam: Initial FINDINGS: Heart size normal.  The upper lobe vessels are prominent. There is haziness of the lungs which is in part due to overlying soft tissues. There is strandy opacity in the right lung base. There blunting of the posterior costophrenic angles     Pulmonary vascular congestion with small posterior pleural effusions and right basilar atelectasis     Ct Head Wo Contrast    Result Date: 6/10/2019  EXAMINATION: CT OF THE HEAD WITHOUT CONTRAST  6/10/2019 4:33 pm TECHNIQUE: CT of the head was performed without the administration of intravenous contrast. Dose modulation, iterative reconstruction, and/or weight based adjustment of the mA/kV was utilized to reduce the radiation dose to as low as reasonably achievable. COMPARISON: None. HISTORY: ORDERING SYSTEM PROVIDED HISTORY: confusion TECHNOLOGIST PROVIDED HISTORY: Has a \"code stroke\" or \"stroke alert\" been called? ->No Ordering Physician Provided Reason for Exam: confusion Acuity: Acute Type of Exam: Initial Relevant Medical/Surgical History: Abnormal Lab (sent from GI doctor with report of abnormal lab results. Pt appears Jaundice at triage. ) FINDINGS: BRAIN/VENTRICLES: There is no acute intracranial hemorrhage, mass effect or midline shift. No abnormal extra-axial fluid collection. The gray-white differentiation is maintained without evidence of an acute infarct. There is prominence of the ventricles and sulci due to global parenchymal volume loss. There are nonspecific areas of hypoattenuation within the periventricular and subcortical white matter, which likely represent chronic microvascular ischemic change. ORBITS: The visualized portion of the orbits demonstrate no acute abnormality. SINUSES: The visualized paranasal sinuses and mastoid air cells demonstrate no acute abnormality. SOFT TISSUES/SKULL: No acute abnormality of the visualized skull or soft tissues. No acute intracranial abnormality.      Ct Abdomen Pelvis W Iv Contrast Additional Contrast? None    Result Date: 6/10/2019  EXAMINATION: CT OF THE ABDOMEN AND PELVIS WITH CONTRAST 6/10/2019 7:43 pm TECHNIQUE: CT of the abdomen and pelvis was performed with the administration of intravenous contrast. Multiplanar reformatted images are provided for review. Dose modulation, iterative reconstruction, and/or weight based adjustment of the mA/kV was utilized to reduce the radiation dose to as low as reasonably achievable. COMPARISON: None. HISTORY: ORDERING SYSTEM PROVIDED HISTORY: anasarca jaundice TECHNOLOGIST PROVIDED HISTORY: If patient is on cardiac monitor and/or pulse ox, they may be taken off cardiac monitor and pulse ox, left on O2 if currently on. All monitors reattached when patient returns to room. Additional Contrast?->None Ordering Physician Provided Reason for Exam: anasarca jaundice Acuity: Unknown Type of Exam: Unknown FINDINGS: Lower Chest: Small bilateral pleural effusions. Multifocal ground-glass infiltrates in the lung bases. Compressive atelectasis in the right lower. Esophageal varices Organs: Shrunken nodular liver. Enlarged spleen. Kidneys, adrenals, pancreas unremarkable. Gallbladder surgically absent. GI/Bowel: No intrinsic gastrointestinal abnormality demonstrated. Pelvis: Streak artifact from right hip prosthesis. Urinary bladder grossly unremarkable. Reproductive organs within normal limits Peritoneum/Retroperitoneum: Small amount of ascites. No pneumoperitoneum. Large collateral vessels in the left upper quadrant. Bones/Soft Tissues: No acute bony abnormality. Body wall edema     1. Cirrhotic liver with findings of portal hypertension including splenomegaly and small ascites as well as esophageal varices and collateral vessels in the upper abdomen. 2. Generalized edema state, including anasarca and pleural effusions. Ground-glass opacities in the lung bases may represent edema or pneumonia         EKG: from ER, sinus rhythm at 72. No acute st elevation. No TWI.   No old studies to [facility] to [facility] Comparison to old EKG from my office chart dated 4/6/2012 is similar          MEDICAL DECISION MAKING:    Patient C/[facility] [address] Problem List:   UTI (urinary tract infection) (6/10/2019)    Assessment: New Problem to me. Pt with evidence of UTI on admit    Plan: empiric iv abx. Blood and urine cx sent in ER   GERD (gastroesophageal reflux disease) (6/11/2019)    Assessment: Established problem. Stable. No reflux    Plan: Continue present orders/plan. Hypokalemia (6/11/2019)    Assessment: Established problem. Stable. Plan: on iv kcl replacement   DM2 (diabetes mellitus, type 2) ([facility] (6/11/2019)    Assessment: Established problem. Stable. Glu OK    Plan: Patient placed on controlled carbohydrate diet. Fingerstick sugars to be checked to monitor for both hypoglycemia as well as hyperglycemia. Sliding scale insulin ordered. Glucagon and dextrose ordered for hypoglycemia. Patient will be continued on home medications. Hemoglobin a1c to be ordered to assess efficacy of therapy. Hepatic encephalopathy ([facility] (6/11/2019)    Assessment: Established problem. NH3 = 73. Per hx from family and ECF staff, pt is reluctant to take lactulose at facility    Plan: GI consulted. Reorder lactulose. Cirrhosis ([facility] (6/11/2019)    Assessment: Established problem. Worsening.  +jaundice. CT Abd from ER reviewed by self, shows cirrhosis    Plan: GI asked to see          Diagnoses as listed above, designated as new or established and plan outlined for each. Data Reviewed:   (1) Lab tests were reviewed or ordered. (1) Radiology tests were reviewed or ordered. (1) Medical test (Echo, EKG, PFT/samir) were ordered. (1)History was not obtained from someone other than patient  (1) Old records  were reviewed - see HPI/MDM for pertinent details if review done. (2) Case wasdiscussed with another health care provider: Dr Zuleyka Wood, ER  (2) Imaging was viewed by myself. Ct abx  (2) EKG  was viewed by myself.        The patient isbeing placed in inpatient status with the expectation of requiring a hospital stay spanning at least two midnights for care and treatment of the problems noted in the problem list.  This determination is also based on thepatients comorbidities and past medical history, the severity and timing of the signs and symptoms upon presentation.         Electronically signed by: Tomás Portillo 6/11/2019

## 2019-06-11 NOTE — CONSULTS
Gastroenterology Consult Note      Patient: Bennett Jarrell  : 1955  Acct#:      Date:  2019    Subjective:       History of Present Illness  Patient is a 59 y.o.   female admitted with UTI (urinary tract infection) [N39.0]  UTI (urinary tract infection) [N39.0] who is seen in consult for cirrhosis. Was seen by Dr Kannan Tolliver 2018 for new dx of cirrhosis felt to be from LESLIE. She did not f/u since. She was admitted to [facility] 5/15/19-19 s/p fall with right tibial fx, left humerus fx, and hepatic encephalopathy. She is now in rehab. She is noncompliant with lactulose. On xifaxan. No h/o ascites requiring paracentesis. Admitted with UTI. Has some mild confusion. No abdominal pain, N/V.      Past Medical History:   Diagnosis Date    Anemia     iron deficiency    Emphysema of lung (HCC)     GERD (gastroesophageal reflux disease)     Hepatitis     Hepatitis A    Hyperlipidemia     PCOS (polycystic ovarian syndrome)     insulin resistance    Sinusitis     SVT (supraventricular tachycardia) ([facility] [address]       Past Surgical History:   Procedure Laterality Date     SECTION      CHOLECYSTECTOMY      COLONOSCOPY N/A 2018    COLONOSCOPY POLYPECTOMY SNARE/COLD BIOPSY performed by Brittney Fernandez MD at [place] [address]  2018    COLONOSCOPY CONTROL HEMORRHAGE performed by Brittney Fernandez MD at [place] [address]    TOTAL HIP ARTHROPLASTY Right 2014    TUBAL LIGATION      UPPER GASTROINTESTINAL ENDOSCOPY N/A 2018    EGD BIOPSY performed by Brittney Fernandez MD at [address]      Past Endoscopic History: EGD with Dr Mirna Aponte 2019     Findings:  · GEJ at 47 cm from incisors no esophageal varices   · Mild erythema in the gastric antrum no signs of active bleeding no gastric varices no portal hypertensive gastropathy  · Normal duodenum including D1 D2 and D3 there is no signs of bleeding      EGD 2018 with Dr Vickey Donald for anemia:  . EGD demonstrated grade I esophageal varices, portal hypertensive gastropathy and numerous small red spots in antrum felt likely due to GAVE or inflammation. Bx more suggestive of inflammation. Screening colonoscopy with Dr Vickey Donald 12/2018: fair prep  Cecal and right colon AVMs with oozing of blood treated with APC, 2 polyps, tics. Admission Meds  No current facility-administered medications on file prior to encounter. Current Outpatient Medications on File Prior to Encounter   Medication Sig Dispense Refill    beclomethasone (QVAR) 80 MCG/ACT inhaler Inhale 1 puff into the lungs 2 times daily      enoxaparin (LOVENOX) 40 MG/0.4ML injection Inject 40 mg into the skin daily      FLUoxetine (PROZAC) 20 MG capsule Take 40 mg by mouth daily      lactulose (CHRONULAC) 10 GM/15ML solution Take 20 g by mouth 2 times daily      Multiple Vitamins-Minerals (THERAPEUTIC MULTIVITAMIN-MINERALS) tablet Take 1 tablet by mouth daily      triamcinolone (NASACORT ALLERGY 24HR) 55 MCG/ACT nasal inhaler 2 sprays by Each Nostril route daily      nystatin (MYCOSTATIN) 232768 units TABS Take 500,000 Units by mouth 2 times daily      rifaximin (XIFAXAN) 550 MG tablet Take 550 mg by mouth 2 times daily      traMADol (ULTRAM) 50 MG tablet Take 50 mg by mouth every 6 hours as needed for Pain.       ALBUTEROL IN Inhale into the lungs as needed      omeprazole (PRILOSEC) 10 MG delayed release capsule Take 20 mg by mouth daily      Loratadine-Pseudoephedrine (CLARITIN-D 24 HOUR PO) Take by mouth daily      ferrous sulfate 325 (65 Fe) MG tablet Take 325 mg by mouth 2 times daily      Metoprolol Succinate 25 MG CS24 Take 25 mg by mouth daily       metFORMIN (GLUCOPHAGE) 500 MG tablet Take 1,000 mg by mouth nightly      ezetimibe (ZETIA) 10 MG tablet Take 10 mg by mouth nightly      aspirin 81 MG tablet Take 81 mg by mouth daily      senna (SENOKOT) 8.6 MG tablet Take 1 tablet by mouth every other day          Allergies  Allergies   Allergen Reactions    Adhesive Tape      Use paper tape      Social   Social History     Tobacco Use    Smoking status: Current Every Day Smoker     Packs/day: 0.50     Years: 48.00     Pack years: 24.00    Smokeless tobacco: Never Used   Substance Use Topics    Alcohol use: No        History reviewed. No pertinent family history. Review of Systems  Constitutional: negative for fevers, chills, sweats    Ears, nose, mouth, throat, and face: negative for nasal congestion and sore throat   Respiratory: negative for cough and shortness of breath   Cardiovascular: negative for chest pain and dyspnea   Gastrointestinal: see hpi   Genitourinary:negative for dysuria and frequency   Integument/breast: negative for pruritus and rash   Hematologic/lymphatic: negative for bleeding and easy bruising   Musculoskeletal:negative for arthralgias and myalgias   Neurological: negative for dizziness and weakness       Physical Exam  Blood pressure (!) 127/58, pulse 76, temperature 98.2 °F (36.8 °C), temperature source Oral, resp. rate 18, height 5' 4\" (1.626 m), weight 220 lb (99.8 kg), SpO2 92 %. General appearance: alert, cooperative, no distress, appears stated age  Eyes: scleral icterus   Head: Normocephalic, without obvious abnormality  Lungs: clear to auscultation bilaterally, Normal Effort  Heart: regular rate and rhythm, normal S1 and S2, no murmurs or rubs  Abdomen: soft, non-tender. Bowel sounds normal. No masses,  no organomegaly. Extremities: atraumatic, no cyanosis, ble edema  Skin: warm and dry, jaundice  Neuro: Grossly intact, A&OX3, mild confusion. No asterixis.    Musculoskeletal: 5/5  strength BUE      Data Review:    Recent Labs     06/10/19  1824 06/11/19  0543   WBC 4.2 4.2   HGB 10.7* 9.9*   HCT 32.9* 30.4*   .8* 106.7*    135     Recent Labs     06/10/19  1824 06/11/19  0543    139   K 3.8 3.3*    102   CO2 27 28   BUN 5* 5*   CREATININE <0.5* <0.5*     Recent Labs     06/10/19  1824 06/11/19  0543   AST 53* 38*   ALT 20 18   BILIDIR 1.6* 1.7*   BILITOT 4.8* 4.5*   ALKPHOS 194* 173*     Recent Labs     06/10/19  1824   LIPASE 40.0     Recent Labs     06/10/19  1823   PROTIME 19.1*   INR 1.68*     No results for input(s): PTT in the last 72 hours. No results for input(s): OCCULTBLD in the last 72 hours. Imaging Studies:                CT-scan of abdomen and pelvis w IV contrast: 6/10/19  Impression   1. Cirrhotic liver with findings of portal hypertension including   splenomegaly and small ascites as well as esophageal varices and collateral   vessels in the upper abdomen. 2. Generalized edema state, including anasarca and pleural effusions. Ground-glass opacities in the lung bases may represent edema or pneumonia                          Assessment:     Principal Problem:    UTI (urinary tract infection)  Active Problems:    GERD (gastroesophageal reflux disease)    Hypokalemia    DM2 (diabetes mellitus, type 2) (HCC)    Hepatic encephalopathy (HCC)    [facility] ([facility]  Resolved Problems:    * No resolved hospital problems. *    Cirrhosis - due to LESLIE. Small ascites on CT - not enough for para. Grade I esophageal varices 11/2018. No GI bleeding. Bili 4.5 now. Was 2.9 on 5/24. INR 1.68 and was 1.5 on 5/21. H/o hepatic encephalopathy - noncompliant with lactulose. On xifaxan. NH3 73 today. UTI    Recommendations:   - lactulose TID and titrate for goal of 3 BMs daily  - xifaxan BID  - start lasix 40 mg daily and aldactone 100 mg daily  - 2 gram low salt diet with dietitian education    Discussed with Dr. Helena Velasquez, [address]    I have personally performed a face to face diagnostic evaluation on this patient. I have interviewed and examined the patient and I agree with the findings and recommended plan of care.   In summary, my findings and plan are the following: pt seen once in office (last year) for new diagnosis of cirrhosis felt to be due to LESLIE. Recent hospitalization for fall/leg fx. Pt not compliant with lactulose in NH due to lack of mobility and fear of diarrhea but has not tried a low dose and willing to try. Will add xifaxan.  Will start diuretics for pl effusions, ascites and peripheral edema       Joon Wheeler MD  [address]

## 2019-06-11 NOTE — PROGRESS NOTES
Shift assessment completed. Routine vitals stable. Scheduled medications given. Patient is awake, alert and oriented, although she is slightly confused at times. Respirations are easy and unlabored. Patient does not appear to be in distress. Call light within reach, bed alarm engaged. Daughter at bedside.

## 2019-06-11 NOTE — PROGRESS NOTES
Routine vitals stable. Patient remains sleeping and will not take scheduled medications. Call light within reach, bed alarm engaged.

## 2019-06-11 NOTE — PROGRESS NOTES
Pt admitted to  at 2300 on 6/11/19. Pt VS taken and are WNL. Pt c/o pain 7/10 to left shoulder. Pt repositioned and brief changed. Pt given fresh water. No further needs at this time. Call light within reach and bed alarm engaged.   .Electronically signed by Nam Lopez RN on 6/11/2019 at 4:10 AM

## 2019-06-11 NOTE — PROGRESS NOTES
.4 Eyes Skin Assessment     The patient is being assess for  Admission    I agree that 2 RN's have performed a thorough Head to Toe Skin Assessment on the patient. ALL assessment sites listed below have been assessed. Areas assessed by both nurses:  [x]   Head, Face, and Ears   [x]   Shoulders, Back, and Chest  [x]   Arms, Elbows, and Hands   [x]   Coccyx, Sacrum, and IschIum  [x]   Legs, Feet, and Heels        Does the Patient have Skin Breakdown?   No         Yang Prevention initiated:  NA   Wound Care Orders initiated:  NA      [facility] nurse consulted for Pressure Injury (Stage 3,4, Unstageable, DTI, NWPT, and Complex wounds), New and Established Ostomies:  No      Nurse 1 eSignature: Electronically signed by Reshma Warren RN on 6/11/19 at 12:26 AM    **SHARE this note so that the co-signing nurse is able to place an eSignature**    Nurse 2 eSignature: Electronically signed by Judy Ashley RN on 6/11/19 at 2:59 AM

## 2019-06-12 LAB
ALBUMIN SERPL-MCNC: 2.1 G/DL (ref 3.4–5)
ALP BLD-CCNC: 177 U/L (ref 40–129)
ALT SERPL-CCNC: 19 U/L (ref 10–40)
AMMONIA: 76 UMOL/L (ref 11–51)
ANION GAP SERPL CALCULATED.3IONS-SCNC: 10 MMOL/L (ref 3–16)
AST SERPL-CCNC: 35 U/L (ref 15–37)
BASOPHILS ABSOLUTE: 0 K/UL (ref 0–0.2)
BASOPHILS RELATIVE PERCENT: 1.2 %
BILIRUB SERPL-MCNC: 3.7 MG/DL (ref 0–1)
BILIRUBIN DIRECT: 1.5 MG/DL (ref 0–0.3)
BILIRUBIN, INDIRECT: 2.2 MG/DL (ref 0–1)
BUN BLDV-MCNC: 4 MG/DL (ref 7–20)
CALCIUM SERPL-MCNC: 8.5 MG/DL (ref 8.3–10.6)
CHLORIDE BLD-SCNC: 102 MMOL/L (ref 99–110)
CO2: 26 MMOL/L (ref 21–32)
CREAT SERPL-MCNC: <0.5 MG/DL (ref 0.6–1.2)
EOSINOPHILS ABSOLUTE: 0.3 K/UL (ref 0–0.6)
EOSINOPHILS RELATIVE PERCENT: 7.1 %
GFR AFRICAN AMERICAN: >60
GFR NON-AFRICAN AMERICAN: >60
GLUCOSE BLD-MCNC: 100 MG/DL (ref 70–99)
GLUCOSE BLD-MCNC: 138 MG/DL (ref 70–99)
GLUCOSE BLD-MCNC: 81 MG/DL (ref 70–99)
GLUCOSE BLD-MCNC: 83 MG/DL (ref 70–99)
GLUCOSE BLD-MCNC: 91 MG/DL (ref 70–99)
HCT VFR BLD CALC: 30.7 % (ref 36–48)
HEMOGLOBIN: 10.1 G/DL (ref 12–16)
LYMPHOCYTES ABSOLUTE: 1.1 K/UL (ref 1–5.1)
LYMPHOCYTES RELATIVE PERCENT: 26.8 %
MAGNESIUM: 1.8 MG/DL (ref 1.8–2.4)
MCH RBC QN AUTO: 35.5 PG (ref 26–34)
MCHC RBC AUTO-ENTMCNC: 33 G/DL (ref 31–36)
MCV RBC AUTO: 107.4 FL (ref 80–100)
MONOCYTES ABSOLUTE: 0.6 K/UL (ref 0–1.3)
MONOCYTES RELATIVE PERCENT: 15.1 %
NEUTROPHILS ABSOLUTE: 2.1 K/UL (ref 1.7–7.7)
NEUTROPHILS RELATIVE PERCENT: 49.8 %
ORGANISM: ABNORMAL
ORGANISM: ABNORMAL
PDW BLD-RTO: 20 % (ref 12.4–15.4)
PERFORMED ON: ABNORMAL
PERFORMED ON: ABNORMAL
PERFORMED ON: NORMAL
PERFORMED ON: NORMAL
PLATELET # BLD: 131 K/UL (ref 135–450)
PMV BLD AUTO: 7.4 FL (ref 5–10.5)
POTASSIUM SERPL-SCNC: 3.4 MMOL/L (ref 3.5–5.1)
RBC # BLD: 2.86 M/UL (ref 4–5.2)
SODIUM BLD-SCNC: 138 MMOL/L (ref 136–145)
TOTAL PROTEIN: 5.8 G/DL (ref 6.4–8.2)
URINE CULTURE, ROUTINE: ABNORMAL
WBC # BLD: 4.1 K/UL (ref 4–11)

## 2019-06-12 PROCEDURE — 36415 COLL VENOUS BLD VENIPUNCTURE: CPT

## 2019-06-12 PROCEDURE — 94640 AIRWAY INHALATION TREATMENT: CPT

## 2019-06-12 PROCEDURE — 97166 OT EVAL MOD COMPLEX 45 MIN: CPT

## 2019-06-12 PROCEDURE — 1200000000 HC SEMI PRIVATE

## 2019-06-12 PROCEDURE — 6370000000 HC RX 637 (ALT 250 FOR IP): Performed by: INTERNAL MEDICINE

## 2019-06-12 PROCEDURE — 97162 PT EVAL MOD COMPLEX 30 MIN: CPT

## 2019-06-12 PROCEDURE — 97530 THERAPEUTIC ACTIVITIES: CPT

## 2019-06-12 PROCEDURE — 2580000003 HC RX 258: Performed by: INTERNAL MEDICINE

## 2019-06-12 PROCEDURE — 80076 HEPATIC FUNCTION PANEL: CPT

## 2019-06-12 PROCEDURE — 80048 BASIC METABOLIC PNL TOTAL CA: CPT

## 2019-06-12 PROCEDURE — 6360000002 HC RX W HCPCS: Performed by: INTERNAL MEDICINE

## 2019-06-12 PROCEDURE — 83735 ASSAY OF MAGNESIUM: CPT

## 2019-06-12 PROCEDURE — 6370000000 HC RX 637 (ALT 250 FOR IP): Performed by: PHYSICIAN ASSISTANT

## 2019-06-12 PROCEDURE — 85025 COMPLETE CBC W/AUTO DIFF WBC: CPT

## 2019-06-12 PROCEDURE — 94761 N-INVAS EAR/PLS OXIMETRY MLT: CPT

## 2019-06-12 PROCEDURE — 82140 ASSAY OF AMMONIA: CPT

## 2019-06-12 RX ORDER — CIPROFLOXACIN 2 MG/ML
400 INJECTION, SOLUTION INTRAVENOUS EVERY 12 HOURS
Status: DISCONTINUED | OUTPATIENT
Start: 2019-06-12 | End: 2019-06-14 | Stop reason: HOSPADM

## 2019-06-12 RX ORDER — LACTULOSE 10 G/15ML
10 SOLUTION ORAL 2 TIMES DAILY
Status: DISCONTINUED | OUTPATIENT
Start: 2019-06-12 | End: 2019-06-14 | Stop reason: HOSPADM

## 2019-06-12 RX ADMIN — CIPROFLOXACIN 400 MG: 2 INJECTION, SOLUTION INTRAVENOUS at 08:39

## 2019-06-12 RX ADMIN — SODIUM CHLORIDE: 9 INJECTION, SOLUTION INTRAVENOUS at 05:36

## 2019-06-12 RX ADMIN — SPIRONOLACTONE 100 MG: 25 TABLET ORAL at 08:39

## 2019-06-12 RX ADMIN — PHENAZOPYRIDINE HYDROCHLORIDE 200 MG: 100 TABLET ORAL at 08:40

## 2019-06-12 RX ADMIN — TRAMADOL HYDROCHLORIDE 50 MG: 50 TABLET, FILM COATED ORAL at 14:53

## 2019-06-12 RX ADMIN — TRAMADOL HYDROCHLORIDE 50 MG: 50 TABLET, FILM COATED ORAL at 20:53

## 2019-06-12 RX ADMIN — SODIUM CHLORIDE: 9 INJECTION, SOLUTION INTRAVENOUS at 19:49

## 2019-06-12 RX ADMIN — LACTULOSE 10 G: 20 SOLUTION ORAL at 08:39

## 2019-06-12 RX ADMIN — CETIRIZINE HYDROCHLORIDE 10 MG: 10 TABLET, FILM COATED ORAL at 08:40

## 2019-06-12 RX ADMIN — NYSTATIN 1500000 UNITS: 100000 SUSPENSION ORAL at 19:49

## 2019-06-12 RX ADMIN — PSEUDOEPHEDRINE HYDROCHLORIDE 240 MG: 120 TABLET, FILM COATED, EXTENDED RELEASE ORAL at 08:39

## 2019-06-12 RX ADMIN — METFORMIN HYDROCHLORIDE 1000 MG: 500 TABLET ORAL at 19:49

## 2019-06-12 RX ADMIN — FLUOXETINE 40 MG: 20 CAPSULE ORAL at 08:40

## 2019-06-12 RX ADMIN — RIFAXIMIN 550 MG: 550 TABLET ORAL at 08:40

## 2019-06-12 RX ADMIN — Medication 10 ML: at 08:40

## 2019-06-12 RX ADMIN — PANTOPRAZOLE SODIUM 40 MG: 40 TABLET, DELAYED RELEASE ORAL at 17:41

## 2019-06-12 RX ADMIN — PHENAZOPYRIDINE HYDROCHLORIDE 200 MG: 100 TABLET ORAL at 17:41

## 2019-06-12 RX ADMIN — PANTOPRAZOLE SODIUM 40 MG: 40 TABLET, DELAYED RELEASE ORAL at 05:39

## 2019-06-12 RX ADMIN — FUROSEMIDE 40 MG: 40 TABLET ORAL at 08:40

## 2019-06-12 RX ADMIN — CIPROFLOXACIN 400 MG: 2 INJECTION, SOLUTION INTRAVENOUS at 19:49

## 2019-06-12 RX ADMIN — TRAMADOL HYDROCHLORIDE 50 MG: 50 TABLET, FILM COATED ORAL at 08:40

## 2019-06-12 RX ADMIN — FERROUS SULFATE TAB 325 MG (65 MG ELEMENTAL FE) 325 MG: 325 (65 FE) TAB at 19:49

## 2019-06-12 RX ADMIN — BUDESONIDE 500 MCG: 0.5 SUSPENSION RESPIRATORY (INHALATION) at 08:13

## 2019-06-12 RX ADMIN — FERROUS SULFATE TAB 325 MG (65 MG ELEMENTAL FE) 325 MG: 325 (65 FE) TAB at 08:40

## 2019-06-12 RX ADMIN — Medication 10 ML: at 19:49

## 2019-06-12 RX ADMIN — METOPROLOL SUCCINATE 25 MG: 25 TABLET, FILM COATED, EXTENDED RELEASE ORAL at 08:40

## 2019-06-12 RX ADMIN — FLUTICASONE PROPIONATE 1 SPRAY: 50 SPRAY, METERED NASAL at 08:40

## 2019-06-12 RX ADMIN — RIFAXIMIN 550 MG: 550 TABLET ORAL at 19:48

## 2019-06-12 RX ADMIN — PHENAZOPYRIDINE HYDROCHLORIDE 200 MG: 100 TABLET ORAL at 13:59

## 2019-06-12 RX ADMIN — BUDESONIDE 500 MCG: 0.5 SUSPENSION RESPIRATORY (INHALATION) at 21:06

## 2019-06-12 ASSESSMENT — PAIN SCALES - GENERAL
PAINLEVEL_OUTOF10: 5
PAINLEVEL_OUTOF10: 3
PAINLEVEL_OUTOF10: 0
PAINLEVEL_OUTOF10: 6
PAINLEVEL_OUTOF10: 0
PAINLEVEL_OUTOF10: 5
PAINLEVEL_OUTOF10: 2
PAINLEVEL_OUTOF10: 6
PAINLEVEL_OUTOF10: 0
PAINLEVEL_OUTOF10: 0
PAINLEVEL_OUTOF10: 3

## 2019-06-12 ASSESSMENT — PAIN DESCRIPTION - PAIN TYPE
TYPE: SURGICAL PAIN
TYPE: SURGICAL PAIN

## 2019-06-12 ASSESSMENT — PAIN DESCRIPTION - LOCATION
LOCATION: SHOULDER
LOCATION: SHOULDER

## 2019-06-12 ASSESSMENT — PAIN DESCRIPTION - ORIENTATION: ORIENTATION: LEFT

## 2019-06-12 NOTE — PROGRESS NOTES
Progress Note - Dr. Adina Raymundo - Internal Medicine  PCP: Giancarlo GIRON [address] [place] [id number] [address] [facility] Day: 2  Code Status: Full Code  Current Diet: DIET LOW SODIUM 2 GM;        CC: follow up on medical issues    Subjective:   Jose J Saini is a 59 y.o. female. She denies problems    Doing ok  A little confused  Complains of arm pain (this is chronic - the shoulder injury is why she is in SNF)    Susceptibility     Citrobacter freundii (1)     Antibiotic Interpretation GAMA Status    amoxicillin-clavulanate Resistant >16/8 mcg/mL     ampicillin Resistant >16 mcg/mL     ceFAZolin Resistant >16 mcg/mL     cefepime Sensitive <=2 mcg/mL     cefotaxime Sensitive 8 mcg/mL     cefTRIAXone Resistant 32 mcg/mL     cefuroxime Resistant >16 mcg/mL     ciprofloxacin Sensitive <=1 mcg/mL     gentamicin Sensitive <=4 mcg/mL     meropenem Sensitive <=1 mcg/mL     nitrofurantoin Sensitive <=32 mcg/mL     piperacillin-tazobactam Sensitive <=16 mcg/mL     trimethoprim-sulfamethoxazole Sensitive <=2/38 mcg/mL     Klebsiella pneumoniae (2)     Antibiotic Interpretation GAMA Status    amoxicillin-clavulanate Resistant >16/8 mcg/mL     ampicillin Resistant >16 mcg/mL     ceFAZolin Resistant >16 mcg/mL      NOTE: Cefazolin should only be used for uncomplicated UTI        for E.coli or Klebsiella pneumoniae. cefepime Intermediate 4 mcg/mL     cefotaxime Sensitive 4 mcg/mL     cefTRIAXone Resistant 8 mcg/mL     cefuroxime Resistant >16 mcg/mL     ciprofloxacin Sensitive <=1 mcg/mL     gentamicin Sensitive <=4 mcg/mL     meropenem Sensitive <=1 mcg/mL     nitrofurantoin Intermediate 64 mcg/mL     piperacillin-tazobactam Sensitive <=16 mcg/mL     trimethoprim-sulfamethoxazole Sensitive <=2/38 mcg/mL           She denies chest pain, denies shortness of breath, denies nausea,  denies emesis. 10 system Review of Systems is reviewed with patient, and pertinent positives are listed here: None .  Otherwise, Review of systems is negative. I have reviewed the patient's medical and social history in detail and updated the computerized patient record. To recap: She  has a past medical history of Anemia, [facility] GERD (gastroesophageal reflux disease), Hepatitis, Hyperlipidemia, PCOS (polycystic ovarian syndrome), Sinusitis, and SVT (supraventricular tachycardia) ([facility] . She  has a past surgical history that includes Dilation and curettage of uterus; Tubal ligation;  section; Cholecystectomy; Total hip arthroplasty (Right, ); Upper gastrointestinal endoscopy (N/A, 2018); Colonoscopy (N/A, 2018); and Colonoscopy (2018). . She  reports that she has been smoking. She has a 24.00 pack-year smoking history. She has never used smokeless tobacco. She reports that she does not drink alcohol or use drugs. .        Active Hospital Problems    Diagnosis Date Noted    GERD (gastroesophageal reflux disease) [K21.9] 2019    Hypokalemia [E87.6] 2019    DM2 (diabetes mellitus, type 2) ([facility] [E11.9] 2019    Hepatic encephalopathy ([facility] [K72.90] 2019    Cirrhosis ([facility] [K74.60] 2019    UTI (urinary tract infection) [N39.0] 06/10/2019       Current Facility-Administered Medications: cetirizine (ZYRTEC) tablet 10 mg, 10 mg, Oral, Daily **AND** pseudoephedrine (SUDAFED 12 HR) extended release tablet 240 mg, 240 mg, Oral, Daily  nystatin (MYCOSTATIN) 405059 UNIT/ML suspension 1,500,000 Units, 15 mL, Oral, BID  potassium chloride (KLOR-CON M) extended release tablet 40 mEq, 40 mEq, Oral, PRN **OR** potassium bicarb-citric acid (EFFER-K) effervescent tablet 40 mEq, 40 mEq, Oral, PRN **OR** potassium chloride 10 mEq/100 mL IVPB (Peripheral Line), 10 mEq, Intravenous, PRN  potassium chloride (KLOR-CON M) extended release tablet 40 mEq, 40 mEq, Oral, PRN **OR** potassium bicarb-citric acid (EFFER-K) effervescent tablet 40 mEq, 40 mEq, Oral, PRN **OR** potassium chloride 10 mEq/100 mL IVPB (Peripheral Line), 10 mEq, Intravenous, PRN  0.9 % sodium chloride bolus, 500 mL, Intravenous, PRN  hydrALAZINE (APRESOLINE) injection 10 mg, 10 mg, Intravenous, Q6H PRN  insulin lispro (HUMALOG) injection pen 0-12 Units, 0-12 Units, Subcutaneous, TID WC  insulin lispro (HUMALOG) injection pen 0-6 Units, 0-6 Units, Subcutaneous, Nightly  glucose (GLUTOSE) 40 % oral gel 15 g, 15 g, Oral, PRN  dextrose 50 % IV solution, 12.5 g, Intravenous, PRN  glucagon (rDNA) injection 1 mg, 1 mg, Intramuscular, PRN  dextrose 5 % solution, 100 mL/hr, Intravenous, PRN  furosemide (LASIX) tablet 40 mg, 40 mg, Oral, Daily  spironolactone (ALDACTONE) tablet 100 mg, 100 mg, Oral, Daily  lactulose (CHRONULAC) 10 GM/15ML solution 10 g, 10 g, Oral, TID  albuterol sulfate  (90 Base) MCG/ACT inhaler 2 puff, 2 puff, Inhalation, Q4H PRN  ferrous sulfate tablet 325 mg, 325 mg, Oral, BID  FLUoxetine (PROZAC) capsule 40 mg, 40 mg, Oral, Daily  metFORMIN (GLUCOPHAGE) tablet 1,000 mg, 1,000 mg, Oral, Nightly  metoprolol succinate (TOPROL XL) extended release tablet 25 mg, 25 mg, Oral, Daily  therapeutic multivitamin-minerals 1 tablet, 1 tablet, Oral, Daily  pantoprazole (PROTONIX) tablet 40 mg, 40 mg, Oral, BID AC  rifaximin (XIFAXAN) tablet 550 mg, 550 mg, Oral, BID  traMADol (ULTRAM) tablet 50 mg, 50 mg, Oral, Q6H PRN  fluticasone (FLONASE) 50 MCG/ACT nasal spray 1 spray, 1 spray, Each Nostril, Daily  0.9 % sodium chloride infusion, , Intravenous, Continuous  sodium chloride flush 0.9 % injection 10 mL, 10 mL, Intravenous, 2 times per day  sodium chloride flush 0.9 % injection 10 mL, 10 mL, Intravenous, PRN  magnesium hydroxide (MILK OF MAGNESIA) 400 MG/5ML suspension 30 mL, 30 mL, Oral, Daily PRN  ondansetron (ZOFRAN) injection 4 mg, 4 mg, Intravenous, Q6H PRN  enoxaparin (LOVENOX) injection 40 mg, 40 mg, Subcutaneous, Daily  cefTRIAXone (ROCEPHIN) 1 g in sterile water 10 mL IV syringe, 1 g, Intravenous, Date    TROPONINI <0.01 06/10/2019       Recent Imaging Results are Reviewed:  Xr Chest Standard (2 Vw)    Result Date: 6/10/2019  EXAMINATION: TWO XRAY VIEWS OF THE CHEST 6/10/2019 6:34 pm COMPARISON: 01/27/2014 HISTORY: ORDERING SYSTEM PROVIDED HISTORY: anasarca TECHNOLOGIST PROVIDED HISTORY: Reason for exam:->anasarca Ordering Physician Provided Reason for Exam: sent from GI doctor with report of abnormal lab results. Pt appears Jaundice at triage. Acuity: Acute Type of Exam: Initial FINDINGS: Heart size normal.  The upper lobe vessels are prominent. There is haziness of the lungs which is in part due to overlying soft tissues. There is strandy opacity in the right lung base. There blunting of the posterior costophrenic angles     Pulmonary vascular congestion with small posterior pleural effusions and right basilar atelectasis     Ct Head Wo Contrast    Result Date: 6/10/2019  EXAMINATION: CT OF THE HEAD WITHOUT CONTRAST  6/10/2019 4:33 pm TECHNIQUE: CT of the head was performed without the administration of intravenous contrast. Dose modulation, iterative reconstruction, and/or weight based adjustment of the mA/kV was utilized to reduce the radiation dose to as low as reasonably achievable. COMPARISON: None. HISTORY: ORDERING SYSTEM PROVIDED HISTORY: confusion TECHNOLOGIST PROVIDED HISTORY: Has a \"code stroke\" or \"stroke alert\" been called? ->No Ordering Physician Provided Reason for Exam: confusion Acuity: Acute Type of Exam: Initial Relevant Medical/Surgical History: Abnormal Lab (sent from GI doctor with report of abnormal lab results. Pt appears Jaundice at triage. ) FINDINGS: BRAIN/VENTRICLES: There is no acute intracranial hemorrhage, mass effect or midline shift. No abnormal extra-axial fluid collection. The gray-white differentiation is maintained without evidence of an acute infarct. There is prominence of the ventricles and sulci due to global parenchymal volume loss.  There are nonspecific areas of hypoattenuation within the periventricular and subcortical white matter, which likely represent chronic microvascular ischemic change. ORBITS: The visualized portion of the orbits demonstrate no acute abnormality. SINUSES: The visualized paranasal sinuses and mastoid air cells demonstrate no acute abnormality. SOFT TISSUES/SKULL: No acute abnormality of the visualized skull or soft tissues. No acute intracranial abnormality. Ct Abdomen Pelvis W Iv Contrast Additional Contrast? None    Result Date: 6/10/2019  EXAMINATION: CT OF THE ABDOMEN AND PELVIS WITH CONTRAST 6/10/2019 7:43 pm TECHNIQUE: CT of the abdomen and pelvis was performed with the administration of intravenous contrast. Multiplanar reformatted images are provided for review. Dose modulation, iterative reconstruction, and/or weight based adjustment of the mA/kV was utilized to reduce the radiation dose to as low as reasonably achievable. COMPARISON: None. HISTORY: ORDERING SYSTEM PROVIDED HISTORY: anasarca jaundice TECHNOLOGIST PROVIDED HISTORY: If patient is on cardiac monitor and/or pulse ox, they may be taken off cardiac monitor and pulse ox, left on O2 if currently on. All monitors reattached when patient returns to room. Additional Contrast?->None Ordering Physician Provided Reason for Exam: anasarca jaundice Acuity: Unknown Type of Exam: Unknown FINDINGS: Lower Chest: Small bilateral pleural effusions. Multifocal ground-glass infiltrates in the lung bases. Compressive atelectasis in the right lower. Esophageal varices Organs: Shrunken nodular liver. Enlarged spleen. Kidneys, adrenals, pancreas unremarkable. Gallbladder surgically absent. GI/Bowel: No intrinsic gastrointestinal abnormality demonstrated. Pelvis: Streak artifact from right hip prosthesis. Urinary bladder grossly unremarkable. Reproductive organs within normal limits Peritoneum/Retroperitoneum: Small amount of ascites. No pneumoperitoneum.  Large collateral vessels in the left upper quadrant. Bones/Soft Tissues: No acute bony abnormality. Body wall edema     1. Cirrhotic liver with findings of portal hypertension including splenomegaly and small ascites as well as esophageal varices and collateral vessels in the upper abdomen. 2. Generalized edema state, including anasarca and pleural effusions. Ground-glass opacities in the lung bases may represent edema or pneumonia       Assessment and Plan:  Patient Active Hospital Problem List:   UTI (urinary tract infection) (6/10/2019)    Assessment: Established problem. Stable. 2 bugs; both sens to cipro    Plan: change iv abx to  cipro   GERD (gastroesophageal reflux disease) (6/11/2019)    Assessment: Established problem. Stable. Plan: Continue present orders/plan. Hypokalemia (6/11/2019)    Assessment: Established problem. Stable. K=3.4    Plan: Continue present orders/plan. DM2 (diabetes mellitus, type 2) ([facility] [address] (6/11/2019)    Assessment: Established problem. Stable. Glu 81    Plan: cont ccc diet   Hepatic encephalopathy (HCC) (6/11/2019)    Assessment: Established problem. Stable. Plan: cont xifaxan, lactulose,    Cirrhosis ([facility] (6/11/2019)    Assessment: Established problem. Stable.      Plan: low Na diet, diuretics started per GI              [facility]  6/12/2019

## 2019-06-12 NOTE — PLAN OF CARE
Problem: Pain:  Description  Pain management should include both nonpharmacologic and pharmacologic interventions. Goal: Pain level will decrease  Description  Pain level will decrease  6/11/2019 2120 by Walker Goyal RN  Outcome: Ongoing     Problem: Pain:  Description  Pain management should include both nonpharmacologic and pharmacologic interventions.   Goal: Control of acute pain  Description  Control of acute pain  6/11/2019 2120 by Walker Goyal RN  Outcome: Ongoing     Problem: Falls - Risk of:  Goal: Will remain free from falls  Description  Will remain free from falls  6/11/2019 2120 by Walker Goyal RN  Outcome: Ongoing     Problem: Falls - Risk of:  Goal: Absence of physical injury  Description  Absence of physical injury  6/11/2019 2120 by Walker Goyal RN  Outcome: Ongoing     Problem: Urinary Elimination:  Goal: Signs and symptoms of infection will decrease  Description  Signs and symptoms of infection will decrease  Outcome: Ongoing     Problem: Urinary Elimination:  Goal: Ability to reestablish a normal urinary elimination pattern will improve - after catheter removal  Description  Ability to reestablish a normal urinary elimination pattern will improve  Outcome: Ongoing     Problem: Urinary Elimination:  Goal: Complications related to the disease process, condition or treatment will be avoided or minimized  Description  Complications related to the disease process, condition or treatment will be avoided or minimized  Outcome: Ongoing

## 2019-06-12 NOTE — PROGRESS NOTES
Physical Therapy    Facility/Department: [address] ONCOLOGY  Initial Assessment    NAME: Sheryl Long  : 1955  MRN: [id number]    Date of Service: 2019    Discharge Recommendations:Sowmya Garcia scored a 10/24 on the AM-PAC short mobility form. Current research shows that an AM-PAC score of 17 or less is typically not associated with a discharge to the patient's home setting. Based on the patients AM-PAC score and their current functional mobility deficits, it is recommended that the patient have 3-5 sessions per week of Physical Therapy at d/c to increase the patients independence. PT Equipment Recommendations  Equipment Needed: No    Assessment   Body structures, Functions, Activity limitations: Decreased functional mobility ; Decreased ADL status; Decreased strength;Decreased safe awareness;Decreased endurance;Decreased balance; Increased Pain;Decreased cognition  Assessment: Pt with decreased mobility and increased pain. Pt needing skilled PT treatment to address these issues. Treatment Diagnosis: decreased mobility  Prognosis: Fair  Decision Making: Medium Complexity  Patient Education: PT POC, DC recommendations. REQUIRES PT FOLLOW UP: Yes  Activity Tolerance  Activity Tolerance: Patient limited by pain; Patient limited by fatigue;Patient limited by endurance       Patient Diagnosis(es): The primary encounter diagnosis was Hyperbilirubinemia. Diagnoses of Acute cystitis without hematuria, Anasarca, and Confusion were also pertinent to this visit. has a past medical history of Anemia, Emphysema of lung ([address] GERD (gastroesophageal reflux disease), Hepatitis, Hyperlipidemia, PCOS (polycystic ovarian syndrome), Sinusitis, and SVT (supraventricular tachycardia) ([address] has a past surgical history that includes Dilation and curettage of uterus; Tubal ligation;  section; Cholecystectomy; Total hip arthroplasty (Right, );  Upper gastrointestinal endoscopy (N/A, 2018); Colonoscopy (N/A, 12/31/2018); and Colonoscopy (12/31/2018). Restrictions  Restrictions/Precautions  Restrictions/Precautions: Weight Bearing, Fall Risk, Contact Precautions(high fall risk)  Required Braces or Orthoses?: Yes  Lower Extremity Weight Bearing Restrictions  Right Lower Extremity Weight Bearing: Non Weight Bearing  Partial Weight Bearing Percentage Or Pounds: tibial plateau fracture, ORIF 5/17 Per out-patient ortho follow-up with Dr. Max Garcia on 6/3: Therapy may begin passive flexion motion through the knee while using pain as a guide  Upper Extremity Weight Bearing Restrictions  Left Upper Extremity Weight Bearing: Non Weight Bearing  Other: L humerus fracture, Reverse TSA 5/22 \"passive motion as tolerated\" per Dr. Max Garcia on 6/3  Required Braces or Orthoses  Right Lower Extremity Brace: Knee Immobilizer(may be removed for therapy (PROM with pain as guide))  Right Upper Extremity Brace/Splint: Sling  Position Activity Restriction  Other position/activity restrictions: 59 y.o. female  who presents to the ED complaining of worsening jaundice. She has an increased amount of confusion as well per family. She currently resides at [facility]. This is due to orthopedic rehab from recent surgeries done at [facility].  She has not had fevers. She denies any acute abdominal pain although does have some chronic cramping. She does have a history of cirrhosis and hepatitis a in the past per chart review  Vision/Hearing  Vision: Impaired  Vision Exceptions: Wears glasses at all times; Cataracts  Hearing: Within functional limits     Subjective  General  Chart Reviewed: Yes  Additional Pertinent Hx: R tib plat fx with ORIF 5/17, L humerus fx TSA. PROM only. Response To Previous Treatment: Patient with no complaints from previous session. Family / Caregiver Present: Yes(OT for eval. )  Diagnosis: UTI, abnormal labs, hepatic encephalopathy.    Follows Commands: Within Functional Limits  General 10 bilat  Ankle Pumps: x 20 bilat  Shoulder Passive Range of Motion: OT performed R UE PROM out of sling     Plan   Plan  Times per week: 3-5x/week  Times per day: Daily  Current Treatment Recommendations: Strengthening, Balance Training, Functional Mobility Training, Transfer Training, Endurance Training, Pain Management, Home Exercise Program, Safety Education & Training, Patient/Caregiver Education & Training, Equipment Evaluation, Education, & procurement, ROM, ADL/Self-care Training  Safety Devices  Type of devices: All fall risk precautions in place, Call light within reach, Chair alarm in place, Gait belt, Patient at risk for falls, Left in chair, Nurse notified  Restraints  Initially in place: No    G-Code       OutComes Score                                                  AM-PAC Score  AM-PAC Inpatient Mobility Raw Score : 10 (06/12/19 1208)  AM-PAC Inpatient T-Scale Score : 32.29 (06/12/19 1208)  Mobility Inpatient CMS 0-100% Score: 76.75 (06/12/19 1208)  Mobility Inpatient CMS G-Code Modifier : CL (06/12/19 1208)          Goals  Short term goals  Time Frame for Short term goals: To be met by DC. Short term goal 1: Pt to perform bed mob with SBA. Short term goal 2: Pt to perform bed to chair transfers with min A x 1. Short term goal 3: Pt to perform mobility maintaining NWB R UE and L LE. Short term goal 4: Rolling bilat with SBA.    Long term goals  Time Frame for Long term goals : LTGs=STGs  Patient Goals   Patient goals : decreased pain R should       Therapy Time   Individual Concurrent Group Co-treatment   Time In 1058         Time Out 1152         Minutes 54         Timed Code Treatment Minutes: [address] [facility] , [id number]

## 2019-06-12 NOTE — PROGRESS NOTES
[facility] GI  Gastroenterology Progress Note    Mario Rosado is a [id number] [place] [address] y.o. female patient. Principal Problem:    UTI (urinary tract infection)  Active Problems:    GERD (gastroesophageal reflux disease)    Hypokalemia    DM2 (diabetes mellitus, type 2) (HCC)    Hepatic encephalopathy (HCC)    Cirrhosis ([facility]  Resolved Problems:    * No resolved hospital problems. *      SUBJECTIVE:  Had one BM so far today. Refused lactulose last night and this am. Upset about urinating more often after diuretics started. ROS:  No fever, chills  No chest pain, palpitations  No SOB, cough  Gastrointestinal ROS: see above    Physical    VITALS:  /73   Pulse 76   Temp 97.9 °F (36.6 °C) (Oral)   Resp 18   Ht 5' 4\" (1.626 m)   Wt 220 lb (99.8 kg)   SpO2 92%   BMI 37.76 kg/m²   TEMPERATURE:  Current - Temp: 97.9 °F (36.6 °C); Max - Temp  Av.9 °F (36.6 °C)  Min: 97.4 °F (36.3 °C)  Max: 98.4 °F (36.9 °C)    NAD  Regular rate   Lungs CTA Bilaterally  Abdomen soft, ND, NT,  Bowel sounds normal.  Mild confusion, no asterixis    Data    Data Review:    Recent Labs     06/10/19  1824 06/11/19  0543 19  0532   WBC 4.2 4.2 4.1   HGB 10.7* 9.9* 10.1*   HCT 32.9* 30.4* 30.7*   .8* 106.7* 107.4*    135 131*     Recent Labs     06/10/19  1824 06/11/19  0543 19  0531    139 138   K 3.8 3.3* 3.4*    102 102   CO2 27 28 26   BUN 5* 5* 4*   CREATININE <0.5* <0.5* <0.5*     Recent Labs     06/10/19  1824 06/11/19  0543 19  0531   AST 53* 38* 35   ALT 20 18 19   BILIDIR 1.6* 1.7* 1.5*   BILITOT 4.8* 4.5* 3.7*   ALKPHOS 194* 173* 177*     Recent Labs     06/10/19  1824   LIPASE 40.0     Recent Labs     06/10/19  1823   PROTIME 19.1*   INR 1.68*     No results for input(s): PTT in the last 72 hours. ASSESSMENT :    Cirrhosis - due to LESLIE. Small ascites on CT - not enough for para. But also has small bilat pleural effusions and peripheral edema. Grade I esophageal varices 2018.  No GI bleeding. Bili 4.5 now. Was 2.9 on 5/24. INR 1.68 and was 1.5 on 5/21. H/o hepatic encephalopathy - noncompliant with lactulose. On xifaxan. NH3 73 today. UTI    PLAN :  - lactulose TID and titrate for goal of 3 BMs daily  - xifaxan BID  - lasix 40 mg daily and aldactone 100 mg daily  - 2 gram low salt diet      Discussed with Dr. David Jimenez, [facility]    I have personally performed a face to face diagnostic evaluation on this patient. I have interviewed and examined the patient and I agree with the findings and recommended plan of care. In summary, my findings and plan are the following: Pt A&O x 3. C/o loose stools. Will reduce lactulose to 15ml bid.      Veronica Schmidt MD  [address]

## 2019-06-12 NOTE — PROGRESS NOTES
Routine VSS. Scheduled medications given. PIV replaced as old one was squad stick. No other needs expressed at this time. Will continue to monitor.

## 2019-06-12 NOTE — CARE COORDINATION
6/12/2019-   Therapy recommending skill, patient from [facility] to return to same level. Per conversation with Lottie Davis will initiate precert.

## 2019-06-12 NOTE — PROGRESS NOTES
Assessment complete. VSS. Scheduled medications given. Patient resting in bed. Respirations even and easy. Call light in reach. Fall precautions in place, bed alarm on. No other needs expressed at this time. Will continue to monitor.

## 2019-06-12 NOTE — PROGRESS NOTES
history of Anemia, Emphysema of lung ([facility] GERD (gastroesophageal reflux disease), Hepatitis, Hyperlipidemia, PCOS (polycystic ovarian syndrome), Sinusitis, and SVT (supraventricular tachycardia) ([facility] has a past surgical history that includes Dilation and curettage of uterus; Tubal ligation;  section; Cholecystectomy; Total hip arthroplasty (Right, ); Upper gastrointestinal endoscopy (N/A, 2018); Colonoscopy (N/A, 2018); and Colonoscopy (2018). Treatment Diagnosis: Above deficits associated with UTI      Restrictions  Restrictions/Precautions  Restrictions/Precautions: Weight Bearing, Fall Risk, Contact Precautions(high fall risk)  Required Braces or Orthoses?: Yes  Lower Extremity Weight Bearing Restrictions  Right Lower Extremity Weight Bearing: Non Weight Bearing  Partial Weight Bearing Percentage Or Pounds: tibial plateau fracture, ORIF  Per out-patient ortho follow-up with Dr. Noah Pineda on 6/3: Therapy may begin passive flexion motion through the knee while using pain as a guide  Upper Extremity Weight Bearing Restrictions  Left Upper Extremity Weight Bearing: Non Weight Bearing  Other: L humerus fracture, Reverse TSA  \"passive motion as tolerated\" per Dr. Noah Pineda on 6/3  Required Braces or Orthoses  Right Lower Extremity Brace: Knee Immobilizer(may be removed for therapy (PROM with pain as guide))  Right Upper Extremity Brace/Splint: Sling  Position Activity Restriction  Other position/activity restrictions: 59 y.o. female  who presents to the ED complaining of worsening jaundice. She has an increased amount of confusion as well per family. She currently resides at [facility]. This is due to orthopedic rehab from recent surgeries done at [facility].  She has not had fevers. She denies any acute abdominal pain although does have some chronic cramping.   She does have a history of cirrhosis and hepatitis a in the past per chart review    Subjective General  Chart Reviewed: Yes  Additional Pertinent Hx: Tibial plateau fx, humeral fx  Diagnosis: UTI  Subjective  Subjective: Pt supine in bed at time of therapist arrival, agreeable to evaluation. Reporting 5/10 pain RN aware  Pain Assessment  Pain Assessment: 0-10  Pain Level: 5  Pain Type: Surgical pain  Pain Location: Shoulder  Pain Orientation: Left      Social/Functional History  Social/Functional History  Type of Home: Facility([facility])  ADL Assistance: Needs assistance  Homemaking Responsibilities: No  Transfer Assistance: Needs assistance  Additional Comments: Pt fell getting out of shower. Surgery 5/17. NWB R LE and L UE. At rehab, using slide board and WC. Pt reports before her fall in May, was sedentary due to hip issues but independent for mobility. PT attempted to call facility for additional information, per staff RN currently unavailable       Objective   Vision: Impaired  Vision Exceptions: Wears glasses at all times; Cataracts  Hearing: Within functional limits    Orientation  Overall Orientation Status: Within Functional Limits(WFL, however confused throughout session)  Observation/Palpation  Posture: Fair  Observation: R UE sling  Balance  Sitting Balance: Stand by assistance  Standing Balance: Unable to assess(comment)  ADL  Feeding: Setup(sipping water)  Toileting: Dependent/Total(depends saturated with urine in bed, dependent for pericare/depends change)  Additional Comments: Declined ADL  Tone RUE  RUE Tone: Normotonic  Tone LUE  LUE Tone: Normotonic  Coordination  Movements Are Fluid And Coordinated: No  Coordination and Movement description: Gross motor impairments; Left UE     Bed mobility  Rolling to Left: Contact guard assistance  Rolling to Right: Contact guard assistance  Supine to Sit: Minimal assistance  Scooting:  Moderate assistance(scooting hips back in chair. )  Comment: Rolling for brief change d/t depends saturated with urine  Transfers  Slide Board: 2 Person assistance(min x2 for safety, A to manage NWB RLE and posterior assist for scooting)     Cognition  Overall Cognitive Status: Exceptions  Arousal/Alertness: Appropriate responses to stimuli;Inconsistent responses to stimuli  Following Commands: Follows one step commands with repetition  Attention Span: Attends with cues to redirect; Difficulty attending to directions  Safety Judgement: Decreased awareness of need for safety  Problem Solving: Good awareness of errors made;Assistance required to implement solutions;Assistance required to identify errors made;Assistance required to correct errors made  Insights: Decreased awareness of deficits  Initiation: Requires cues for some  Sequencing: Requires cues for some  Cognition Comment: Poor carryover of NWB precautions for RLE and LUE.  Pt inconsistent with responses throughout evaluation, repeatedly stated she was told she has no restrictions per MD; however she then stated she has restrictions once PROM was performed as tolerated per MD order Dr. Sp Montes 6/3        Sensation  Overall Sensation Status: WFL        LUE PROM (degrees)  LUE PROM: Exceptions  L Shoulder Flex  0-180: ~0-20*  L Shoulder ABduction 0-180: ~0-25*  L Shoulder Int Rotation  0-70: ~0-60*  L Shoulder Ext Rotation  0-90: ~0-15*  L Elbow Flexion 0-145: ~0-125*  RUE AROM (degrees)  RUE AROM : WFL  LUE Strength  Gross LUE Strength: Exceptions to WFL(DNT d/t NWB, sling)  L Hand General: 4/5  RUE Strength  Gross RUE Strength: WFL  R Hand General: 4/5                   Plan   Plan  Times per week: 3-5  Times per day: Daily  Current Treatment Recommendations: Strengthening, Patient/Caregiver Education & Training, Self-Care / ADL, Endurance Training, Equipment Evaluation, Education, & procurement    G-Code     OutComes Score                                                  AM-PAC Score        AM-[facility] Inpatient Daily Activity Raw Score: 11 (06/12/19 1216)  AM-PAC Inpatient ADL T-Scale Score : 29.04 (06/12/19 1216)  ADL Inpatient CMS 0-100% Score: 70.42 (06/12/19 1216)  ADL Inpatient CMS G-Code Modifier : CL (06/12/19 1216)    Goals  Short term goals  Time Frame for Short term goals: discharge  Short term goal 1: Fxl transfer min A  Short term goal 2: UB/LB dressing mod A  Short term goal 3: UB/LB bathing mod A  Short term goal 4: Toileting mod A  Long term goals  Time Frame for Long term goals : LTG=STG       Therapy Time   Individual Concurrent Group Co-treatment   Time In 1058         Time Out 1152         Minutes 54            Timed Code Treatment Minutes:  39 Minutes    Total Treatment Minutes:  54 minutes    Pinky Idol, MOT OTR/L [id number]    Pinky Idol, OT

## 2019-06-13 ENCOUNTER — APPOINTMENT (OUTPATIENT)
Dept: GENERAL RADIOLOGY | Age: 64
DRG: 442 | End: 2019-06-13
Payer: COMMERCIAL

## 2019-06-13 PROBLEM — M25.512 LEFT SHOULDER PAIN: Status: ACTIVE | Noted: 2019-06-13

## 2019-06-13 LAB
ANION GAP SERPL CALCULATED.3IONS-SCNC: 10 MMOL/L (ref 3–16)
BASOPHILS ABSOLUTE: 0 K/UL (ref 0–0.2)
BASOPHILS RELATIVE PERCENT: 0.8 %
BUN BLDV-MCNC: 4 MG/DL (ref 7–20)
CALCIUM SERPL-MCNC: 8.4 MG/DL (ref 8.3–10.6)
CHLORIDE BLD-SCNC: 101 MMOL/L (ref 99–110)
CO2: 27 MMOL/L (ref 21–32)
CREAT SERPL-MCNC: <0.5 MG/DL (ref 0.6–1.2)
EOSINOPHILS ABSOLUTE: 0.3 K/UL (ref 0–0.6)
EOSINOPHILS RELATIVE PERCENT: 5.4 %
GFR AFRICAN AMERICAN: >60
GFR NON-AFRICAN AMERICAN: >60
GLUCOSE BLD-MCNC: 116 MG/DL (ref 70–99)
GLUCOSE BLD-MCNC: 120 MG/DL (ref 70–99)
GLUCOSE BLD-MCNC: 82 MG/DL (ref 70–99)
GLUCOSE BLD-MCNC: 87 MG/DL (ref 70–99)
GLUCOSE BLD-MCNC: 88 MG/DL (ref 70–99)
HCT VFR BLD CALC: 31 % (ref 36–48)
HEMOGLOBIN: 10.2 G/DL (ref 12–16)
LYMPHOCYTES ABSOLUTE: 1.5 K/UL (ref 1–5.1)
LYMPHOCYTES RELATIVE PERCENT: 30.2 %
MCH RBC QN AUTO: 35.6 PG (ref 26–34)
MCHC RBC AUTO-ENTMCNC: 32.9 G/DL (ref 31–36)
MCV RBC AUTO: 108.4 FL (ref 80–100)
MONOCYTES ABSOLUTE: 0.7 K/UL (ref 0–1.3)
MONOCYTES RELATIVE PERCENT: 13.9 %
NEUTROPHILS ABSOLUTE: 2.5 K/UL (ref 1.7–7.7)
NEUTROPHILS RELATIVE PERCENT: 49.7 %
PDW BLD-RTO: 20.1 % (ref 12.4–15.4)
PERFORMED ON: ABNORMAL
PERFORMED ON: ABNORMAL
PERFORMED ON: NORMAL
PERFORMED ON: NORMAL
PLATELET # BLD: 140 K/UL (ref 135–450)
PMV BLD AUTO: 7.7 FL (ref 5–10.5)
POTASSIUM SERPL-SCNC: 3 MMOL/L (ref 3.5–5.1)
RBC # BLD: 2.86 M/UL (ref 4–5.2)
SODIUM BLD-SCNC: 138 MMOL/L (ref 136–145)
WBC # BLD: 5.1 K/UL (ref 4–11)

## 2019-06-13 PROCEDURE — 6370000000 HC RX 637 (ALT 250 FOR IP): Performed by: PHYSICIAN ASSISTANT

## 2019-06-13 PROCEDURE — 73030 X-RAY EXAM OF SHOULDER: CPT

## 2019-06-13 PROCEDURE — 94640 AIRWAY INHALATION TREATMENT: CPT

## 2019-06-13 PROCEDURE — 6370000000 HC RX 637 (ALT 250 FOR IP): Performed by: INTERNAL MEDICINE

## 2019-06-13 PROCEDURE — 97530 THERAPEUTIC ACTIVITIES: CPT

## 2019-06-13 PROCEDURE — 97535 SELF CARE MNGMENT TRAINING: CPT

## 2019-06-13 PROCEDURE — 2580000003 HC RX 258: Performed by: INTERNAL MEDICINE

## 2019-06-13 PROCEDURE — 6360000002 HC RX W HCPCS: Performed by: INTERNAL MEDICINE

## 2019-06-13 PROCEDURE — 85025 COMPLETE CBC W/AUTO DIFF WBC: CPT

## 2019-06-13 PROCEDURE — 36415 COLL VENOUS BLD VENIPUNCTURE: CPT

## 2019-06-13 PROCEDURE — 80048 BASIC METABOLIC PNL TOTAL CA: CPT

## 2019-06-13 PROCEDURE — 94761 N-INVAS EAR/PLS OXIMETRY MLT: CPT

## 2019-06-13 PROCEDURE — 99253 IP/OBS CNSLTJ NEW/EST LOW 45: CPT | Performed by: NURSE PRACTITIONER

## 2019-06-13 PROCEDURE — 1200000000 HC SEMI PRIVATE

## 2019-06-13 RX ORDER — DIPHENHYDRAMINE HCL 25 MG
12.5 TABLET ORAL NIGHTLY PRN
Status: DISCONTINUED | OUTPATIENT
Start: 2019-06-13 | End: 2019-06-14 | Stop reason: HOSPADM

## 2019-06-13 RX ADMIN — PHENAZOPYRIDINE HYDROCHLORIDE 200 MG: 100 TABLET ORAL at 12:41

## 2019-06-13 RX ADMIN — FUROSEMIDE 40 MG: 40 TABLET ORAL at 09:07

## 2019-06-13 RX ADMIN — CIPROFLOXACIN 400 MG: 2 INJECTION, SOLUTION INTRAVENOUS at 08:23

## 2019-06-13 RX ADMIN — ENOXAPARIN SODIUM 40 MG: 40 INJECTION SUBCUTANEOUS at 09:10

## 2019-06-13 RX ADMIN — BUDESONIDE 500 MCG: 0.5 SUSPENSION RESPIRATORY (INHALATION) at 07:35

## 2019-06-13 RX ADMIN — RIFAXIMIN 550 MG: 550 TABLET ORAL at 09:07

## 2019-06-13 RX ADMIN — BUDESONIDE 500 MCG: 0.5 SUSPENSION RESPIRATORY (INHALATION) at 21:23

## 2019-06-13 RX ADMIN — TRAMADOL HYDROCHLORIDE 50 MG: 50 TABLET, FILM COATED ORAL at 09:05

## 2019-06-13 RX ADMIN — FLUTICASONE PROPIONATE 1 SPRAY: 50 SPRAY, METERED NASAL at 09:08

## 2019-06-13 RX ADMIN — CETIRIZINE HYDROCHLORIDE 10 MG: 10 TABLET, FILM COATED ORAL at 09:08

## 2019-06-13 RX ADMIN — TRAMADOL HYDROCHLORIDE 50 MG: 50 TABLET, FILM COATED ORAL at 21:04

## 2019-06-13 RX ADMIN — FERROUS SULFATE TAB 325 MG (65 MG ELEMENTAL FE) 325 MG: 325 (65 FE) TAB at 09:07

## 2019-06-13 RX ADMIN — PANTOPRAZOLE SODIUM 40 MG: 40 TABLET, DELAYED RELEASE ORAL at 05:15

## 2019-06-13 RX ADMIN — NYSTATIN 1500000 UNITS: 100000 SUSPENSION ORAL at 20:56

## 2019-06-13 RX ADMIN — TRAMADOL HYDROCHLORIDE 50 MG: 50 TABLET, FILM COATED ORAL at 02:54

## 2019-06-13 RX ADMIN — PHENAZOPYRIDINE HYDROCHLORIDE 200 MG: 100 TABLET ORAL at 08:23

## 2019-06-13 RX ADMIN — LACTULOSE 10 G: 20 SOLUTION ORAL at 17:39

## 2019-06-13 RX ADMIN — SODIUM CHLORIDE: 9 INJECTION, SOLUTION INTRAVENOUS at 10:43

## 2019-06-13 RX ADMIN — METOPROLOL SUCCINATE 25 MG: 25 TABLET, FILM COATED, EXTENDED RELEASE ORAL at 09:07

## 2019-06-13 RX ADMIN — FLUOXETINE 40 MG: 20 CAPSULE ORAL at 09:07

## 2019-06-13 RX ADMIN — METFORMIN HYDROCHLORIDE 1000 MG: 500 TABLET ORAL at 20:56

## 2019-06-13 RX ADMIN — Medication 10 ML: at 20:57

## 2019-06-13 RX ADMIN — FERROUS SULFATE TAB 325 MG (65 MG ELEMENTAL FE) 325 MG: 325 (65 FE) TAB at 20:57

## 2019-06-13 RX ADMIN — PHENAZOPYRIDINE HYDROCHLORIDE 200 MG: 100 TABLET ORAL at 17:35

## 2019-06-13 RX ADMIN — SPIRONOLACTONE 100 MG: 25 TABLET ORAL at 09:08

## 2019-06-13 RX ADMIN — RIFAXIMIN 550 MG: 550 TABLET ORAL at 20:56

## 2019-06-13 RX ADMIN — NYSTATIN 1500000 UNITS: 100000 SUSPENSION ORAL at 09:11

## 2019-06-13 RX ADMIN — CIPROFLOXACIN 400 MG: 2 INJECTION, SOLUTION INTRAVENOUS at 20:57

## 2019-06-13 RX ADMIN — PANTOPRAZOLE SODIUM 40 MG: 40 TABLET, DELAYED RELEASE ORAL at 15:58

## 2019-06-13 ASSESSMENT — PAIN DESCRIPTION - PAIN TYPE
TYPE: SURGICAL PAIN

## 2019-06-13 ASSESSMENT — PAIN SCALES - GENERAL
PAINLEVEL_OUTOF10: 0
PAINLEVEL_OUTOF10: 3
PAINLEVEL_OUTOF10: 4
PAINLEVEL_OUTOF10: 0
PAINLEVEL_OUTOF10: 8
PAINLEVEL_OUTOF10: 8
PAINLEVEL_OUTOF10: 6
PAINLEVEL_OUTOF10: 0
PAINLEVEL_OUTOF10: 6
PAINLEVEL_OUTOF10: 7
PAINLEVEL_OUTOF10: 0
PAINLEVEL_OUTOF10: 3

## 2019-06-13 ASSESSMENT — PAIN DESCRIPTION - ORIENTATION
ORIENTATION: LEFT

## 2019-06-13 ASSESSMENT — PAIN DESCRIPTION - PROGRESSION
CLINICAL_PROGRESSION: GRADUALLY WORSENING
CLINICAL_PROGRESSION: GRADUALLY WORSENING

## 2019-06-13 ASSESSMENT — PAIN DESCRIPTION - DESCRIPTORS
DESCRIPTORS: ACHING
DESCRIPTORS: ACHING;SHARP
DESCRIPTORS: ACHING
DESCRIPTORS: ACHING
DESCRIPTORS: JABBING;THROBBING

## 2019-06-13 ASSESSMENT — PAIN DESCRIPTION - LOCATION
LOCATION: SHOULDER

## 2019-06-13 ASSESSMENT — PAIN DESCRIPTION - FREQUENCY: FREQUENCY: CONTINUOUS

## 2019-06-13 NOTE — CARE COORDINATION
Discharge Planning Assessment  RN/SW discharge planner met with patient/(and family member) to discuss reason for admission, current living situation, and potential needs at the time of discharge    Demographics/Insurance verified Yes    Current type of dwelling: house    Patient from ECF/SW confirmed with:  Pt admitted from [facility]. Facility confirmed bed type and hold. Pt confirmed return to facility. Living arrangements:  Home alone    PCP: Ray    Last Visit to PCP: stated last month    DME: stated none    Active with any community resources/agencies/skilled home care: stated no services    Medication compliance issues: stated no issues    Financial issues that could impact healthcare: pt stated she is concerned about her hospital bill-informed her that financial counselor has been consulted to meet w/pt regarding this. Otherwise pt stated her meds have been cheap enough she can afford. Transportation at the time of discharge: [facility]    Tentative discharge plan: Pt will d/c back to [facility] once medically stable and precert obtained.     Electronically signed by VICKY Vega on 6/13/2019 at 11:17 AM

## 2019-06-13 NOTE — PROGRESS NOTES
[facility] GI  Gastroenterology Progress Note    Krysten Salazar is a 59 y.o. female patient. Principal Problem:    UTI (urinary tract infection)  Active Problems:    GERD (gastroesophageal reflux disease)    Hypokalemia    DM2 (diabetes mellitus, type 2) (HCC)    Hepatic encephalopathy (HCC)    Cirrhosis (HCC)    Left shoulder pain  Resolved Problems:    * No resolved hospital problems. *      SUBJECTIVE: Had multiple BMs yesterday. None today so far. ROS:  No fever, chills  No chest pain, palpitations  No SOB, cough  Gastrointestinal ROS: see above    Physical    VITALS:  /69   Pulse 76   Temp 97.9 °F (36.6 °C) (Oral)   Resp 18   Ht 5' 4\" (1.626 m)   Wt 220 lb (99.8 kg)   SpO2 92%   BMI 37.76 kg/m²   TEMPERATURE:  Current - Temp: 97.9 °F (36.6 °C); Max - Temp  Av.1 °F (36.7 °C)  Min: 97.9 °F (36.6 °C)  Max: 98.5 °F (36.9 °C)    NAD  Regular rate   Lungs CTA Bilaterally  Abdomen soft, ND, NT,  Bowel sounds normal.  A&Ox3. No asterixis    Data    Data Review:    Recent Labs     19  0543 19  0532 19  0549   WBC 4.2 4.1 5.1   HGB 9.9* 10.1* 10.2*   HCT 30.4* 30.7* 31.0*   .7* 107.4* 108.4*    131* 140     Recent Labs     19  0543 19  0531 19  0549    138 138   K 3.3* 3.4* 3.0*    102 101   CO2 28 26 27   BUN 5* 4* 4*   CREATININE <0.5* <0.5* <0.5*     Recent Labs     06/10/19  1824 19  0543 19  0531   AST 53* 38* 35   ALT    BILIDIR 1.6* 1.7* 1.5*   BILITOT 4.8* 4.5* 3.7*   ALKPHOS 194* 173* 177*     Recent Labs     06/10/19  182   LIPASE 40.0     Recent Labs     06/10/19  1823   PROTIME 19.1*   INR 1.68*     No results for input(s): PTT in the last 72 hours. ASSESSMENT :    Cirrhosis - due to LESLIE. Small ascites on CT - not enough for para. But also has small bilat pleural effusions and peripheral edema. Grade I esophageal varices 2018. No GI bleeding. Bili 2.9 on . Elevated at admission but down to 3.7.

## 2019-06-13 NOTE — PLAN OF CARE
Problem: Pain:  Description  Pain management should include both nonpharmacologic and pharmacologic interventions. Goal: Pain level will decrease  Description  Pain level will decrease  Outcome: Ongoing  Goal: Control of acute pain  Description  Control of acute pain  Outcome: Ongoing     Problem: Falls - Risk of:  Goal: Will remain free from falls  Description  Will remain free from falls  Outcome: Ongoing  Goal: Absence of physical injury  Description  Absence of physical injury  Outcome: Ongoing     Problem: Risk for Impaired Skin Integrity  Goal: Tissue integrity - skin and mucous membranes  Description  Structural intactness and normal physiological function of skin and  mucous membranes.   Outcome: Ongoing     Problem: Urinary Elimination:  Goal: Signs and symptoms of infection will decrease  Description  Signs and symptoms of infection will decrease  Outcome: Ongoing  Goal: Ability to reestablish a normal urinary elimination pattern will improve - after catheter removal  Description  Ability to reestablish a normal urinary elimination pattern will improve  Outcome: Ongoing  Goal: Complications related to the disease process, condition or treatment will be avoided or minimized  Description  Complications related to the disease process, condition or treatment will be avoided or minimized  Outcome: Ongoing     Problem: Serum Glucose Level - Abnormal:  Goal: Ability to maintain appropriate glucose levels will improve  Description  Ability to maintain appropriate glucose levels will improve  Outcome: Ongoing

## 2019-06-13 NOTE — PROGRESS NOTES
Physical Therapy  Facility/Department: [address] ONCOLOGY  Daily Treatment Note  NAME: Jose Bhat  : 1955  MRN: [id number]    Date of Service: 2019    Discharge Recommendations:  Jose Bhat scored a 8/24 on the AM-PAC short mobility form. Current research shows that an AM-PAC score of 17 or less is typically not associated with a discharge to the patient's home setting. Based on the patients AM-PAC score and their current functional mobility deficits, it is recommended that the patient have 3-5 sessions per week of Physical Therapy at d/c to increase the patients independence. PT Equipment Recommendations  Equipment Needed: No    Assessment   Body structures, Functions, Activity limitations: Decreased functional mobility ; Decreased ADL status; Decreased strength;Decreased safe awareness;Decreased endurance;Decreased balance; Increased Pain;Decreased cognition  Assessment: Continued skilled PT to promote increased independence with bed mobility and transfers to change while maintaining NWB on RLE and LUE. Treatment Diagnosis: decreased mobility  Prognosis: Fair  Patient Education: PT POC, DC recommendations. REQUIRES PT FOLLOW UP: Yes  Activity Tolerance  Activity Tolerance: Patient limited by pain; Patient limited by fatigue  Activity Tolerance: reporting back pain due to sitting unsupported     Patient Diagnosis(es): The primary encounter diagnosis was Hyperbilirubinemia. Diagnoses of Acute cystitis without hematuria, Anasarca, and Confusion were also pertinent to this visit. has a past medical history of Anemia, Emphysema of lung ([address] GERD (gastroesophageal reflux disease), Hepatitis, Hyperlipidemia, PCOS (polycystic ovarian syndrome), Sinusitis, and SVT (supraventricular tachycardia) ([address] has a past surgical history that includes Dilation and curettage of uterus; Tubal ligation;  section; Cholecystectomy; Total hip arthroplasty (Right, 2014);  Upper gastrointestinal endoscopy (N/A, 11/2/2018); Colonoscopy (N/A, 12/31/2018); and Colonoscopy (12/31/2018). Restrictions  Restrictions/Precautions  Restrictions/Precautions: Weight Bearing, Fall Risk, Contact Precautions  Required Braces or Orthoses?: Yes  Lower Extremity Weight Bearing Restrictions  Right Lower Extremity Weight Bearing: Non Weight Bearing  Partial Weight Bearing Percentage Or Pounds: tibial plateau fracture, ORIF 5/17 Per out-patient ortho follow-up with Dr. Marie Sheehan on 6/3: Therapy may begin passive flexion motion through the knee while using pain as a guide  Upper Extremity Weight Bearing Restrictions  Left Upper Extremity Weight Bearing: Non Weight Bearing  Other: L humerus fracture, Reverse TSA 5/22 \"passive motion as tolerated\" per Dr. Marie Sheehan on 6/3  Required Braces or Orthoses  Right Lower Extremity Brace: Knee Immobilizer  Left Upper Extremity Brace/Splint: Sling  Position Activity Restriction  Other position/activity restrictions: 59 y.o. female  who presents to the ED complaining of worsening jaundice. She has an increased amount of confusion as well per family. She currently resides at [facility]. This is due to orthopedic rehab from recent surgeries done at [facility].  She has not had fevers. She denies any acute abdominal pain although does have some chronic cramping. She does have a history of cirrhosis and hepatitis a in the past per chart review  Subjective   General  Chart Reviewed: Yes  Response To Previous Treatment: Patient with no complaints from previous session. Family / Caregiver Present: No  Subjective  Subjective: Agreed to therapy with encouragement. Reporting pain in multiple places (nsg notified). General Comment  Comments: PT supine in bed upon arrival. Saturated in urine.   Pain Screening  Patient Currently in Pain: Yes  Pain Assessment  Pain Assessment: 0-10  Pain Type: Surgical pain  Pain Location: Shoulder  Pain Orientation: Left  Vital Signs  Patient Currently in Pain: Yes       Orientation  Orientation  Overall Orientation Status: Within Functional Limits(however, conversation was confusing at times)  Cognition      Objective   Bed mobility  Rolling to Left: Moderate assistance  Rolling to Right: Moderate assistance  Supine to Sit: Moderate assistance(of trunk, pt able to move LEs towards EOB)  Comment: rolling for brief change  Transfers  Lateral Transfers: 2 Person Assistance(sliding board with downhill direction towards R due to chair set-up with min A of 2 to control movement)  Ambulation  Ambulation?: No  Wheelchair Activities  Propulsion: No     Balance  Posture: Fair(fwd head, round shoulders)  Sitting - Static: Good  Sitting - Dynamic: Good  Comments: SBA for sitting EOB during ADL activity without feet supported on ground. LUE in sling. G-Code     OutComes Score                                                     AM-PAC Score  AM-PAC Inpatient Mobility Raw Score : 8 (06/13/19 1427)  AM-PAC Inpatient T-Scale Score : 28.52 (06/13/19 1427)  Mobility Inpatient CMS 0-100% Score: 86.62 (06/13/19 1427)  Mobility Inpatient CMS G-Code Modifier : CM (06/13/19 1427)          Goals-- no goals met   Short term goals  Time Frame for Short term goals: To be met by DC. Short term goal 1: Pt to perform bed mob with SBA. Short term goal 2: Pt to perform bed to chair transfers with min A x 1. Short term goal 3: Pt to perform mobility maintaining NWB R UE and L LE. Short term goal 4: Rolling bilat with SBA.    Long term goals  Time Frame for Long term goals : LTGs=STGs  Patient Goals   Patient goals : decreased pain R should    Plan    Plan  Times per week: 3-5x/week  Times per day: Daily  Current Treatment Recommendations: Strengthening, Balance Training, Functional Mobility Training, Transfer Training, Endurance Training, Pain Management, Home Exercise Program, Safety Education & Training, Patient/Caregiver Education & Training, Equipment Evaluation, Education, & procurement, ROM, ADL/Self-care Training  Safety Devices  Type of devices:  All fall risk precautions in place, Call light within reach, Chair alarm in place, Gait belt, Patient at risk for falls, Left in chair, Nurse notified  Restraints  Initially in place: No     Therapy Time   Individual Concurrent Group Co-treatment   Time In       1320   Time Out       1413   Minutes       53   Timed Code Treatment Minutes: Rohit Alaniz, [id number]

## 2019-06-13 NOTE — PROGRESS NOTES
Assessment complete. VSS. Patient resting in bed. Respirations even and easy. Call light in reach. Fall precautions in place, bed alarm on. Patient refusing potassium replacement at this time for a potassium of 3.4. No other needs expressed at this time. Will continue to monitor.

## 2019-06-13 NOTE — PROGRESS NOTES
Shift assessment completed and documented at this time. Resting quietly in bed after receiving breathing treatment this morning per RT. Denies pain at this time. LUE in sling; steri strips intact to left shoulder. The care plan and education has been reviewed and mutually agreed upon with the patient. Denies needs at this time. Call light in reach. Will continue to monitor.

## 2019-06-13 NOTE — CONSULTS
[id number] [facility] Orthopedic Surgery  Consult Note    Patient: Tomer Worthington  Admit Date: 6/10/2019  Requesting Physician: Gilberto Eisenmenger, MD  Room: [address] [place] [address]51    Chief complaint: Left shoulder pain    HPI: Tomer Worthington is a 59 y.o. female who presented to [facility] with increased confusion and jaundice on 6/10/19. She had been recovering from RIGHT tib plateau fx (s/p ORIF) and LEFT rTSA with Dr. Jose Li at [facility] on 19. She has followed up with his has an outpt. She was undergoing PT at [facility]. She feels her shoulder pain has steadily gotten worse since working with PT post-operatively. She remains in a sling. She denies numbness or tingling. She denies any falls or other trauma. Denies other injuries. Describes pain in the left shoudler of moderate to severe intensity and of aching, burning nature since a few days after her surgery which is relieved by pain medications and rest.  Denies new numbness/tingling. Imaging of left shoulder was ordered this AM, but has not yet been completed. Patient was said to remain NWB on the right leg, but is ok for transfers from review of notes.       Medical History:  Past Medical History:   Diagnosis Date    Anemia     iron deficiency    Emphysema of lung (HCC)     GERD (gastroesophageal reflux disease)     Hepatitis     Hepatitis A    Hyperlipidemia     PCOS (polycystic ovarian syndrome)     insulin resistance    Sinusitis     [address] ([address]raventricular tachy[address]) ([facility] [address] [facility]      Past Surgical History:   Procedure Laterality Date     SECTION      CHOLECYSTECTOMY      COLONOSCOPY N/A 2018    COLONOSCOPY POLYPECTOMY SNARE/COLD BIOPSY performed by Breanne Underwood MD at [facility] [address]  2018    COLONOSCOPY CONTROL HEMORRHAGE performed by Breanne Underwood MD at [place] [address]    TOTAL HIP ARTHROPLASTY Right 2014    TUBAL LIGATION      UPPER GASTROINTESTINAL ENDOSCOPY N/A 11/2/2018    EGD BIOPSY performed by Eufemia Tello MD at [address] [place] [address] History:    reports that she has been smoking. She has a 24.00 pack-year smoking history. She has never used smokeless tobacco.    Family History:  History reviewed. No pertinent family history. Medications:  ALL MEDICATIONS HAVE BEEN REVIEWED:  Scheduled:   ciprofloxacin  400 mg Intravenous Q12H    lactulose  10 g Oral BID    cetirizine  10 mg Oral Daily    And    pseudoephedrine  240 mg Oral Daily    nystatin  15 mL Oral BID    insulin lispro  0-12 Units Subcutaneous TID WC    insulin lispro  0-6 Units Subcutaneous Nightly    furosemide  40 mg Oral Daily    spironolactone  100 mg Oral Daily    ferrous sulfate  325 mg Oral BID    FLUoxetine  40 mg Oral Daily    metFORMIN  1,000 mg Oral Nightly    metoprolol succinate  25 mg Oral Daily    therapeutic multivitamin-minerals  1 tablet Oral Daily    pantoprazole  40 mg Oral BID AC    rifaximin  550 mg Oral BID    fluticasone  1 spray Each Nostril Daily    sodium chloride flush  10 mL Intravenous 2 times per day    enoxaparin  40 mg Subcutaneous Daily    phenazopyridine  200 mg Oral TID WC    budesonide  0.5 mg Nebulization BID     Continuous:   dextrose      sodium chloride 75 mL/hr at 06/12/19 1949     PRN:diphenhydrAMINE, potassium chloride **OR** potassium alternative oral replacement **OR** potassium chloride, potassium chloride **OR** potassium alternative oral replacement **OR** potassium chloride, sodium chloride, hydrALAZINE, glucose, dextrose, glucagon (rDNA), dextrose, albuterol sulfate HFA, traMADol, sodium chloride flush, magnesium hydroxide, ondansetron    Allergies: Allergies   Allergen Reactions    Adhesive Tape      Use paper tape       Review of Systems:  Constitutional: Negative for fever, chills, fatigue. Skin:  Negative for pruritis, rash  Eyes: Negative for photophobia and visual disturbance. ENT:  Negative for rhinorrhea, epistaxis, sore throat  Respiratory:  Negative for cough and shortness of breath. Cardiovascular: Negative for chest pain. Gastrointestinal: Negative for nausea, vomiting, diarrhea. Genitourinary: Negative for dysuria and difficulty urinating. Neurological: Negative for confusion, dysarthria, tremors, seizures. Psychiatric:  Negative for depression or anxiety  Musculoskeletal:  Positive for limited ROM and pain in the left shoulder. Objective:  Vitals:    06/13/19 0815   BP: 114/69   Pulse: 76   Resp: 18   Temp: 97.9 °F (36.6 °C)   SpO2: 92%      Physical Examination:  GENERAL: No apparent distress, well-nourished  SKIN:  Warm and dry  EYES: Nonicteric. ENT: Mucous membranes moist  HEAD: Normocephalic, atraumatic  RESPIRATORY: Resp easy and unlabored  CARDIOVASCULAR: Regular rate and rhythm  GI: Abdomen soft, nontender  NEURO: Awake and alert. No speech defect  PSYCHIATRIC: Appropriate affect; not agitated  MUSCULOSKELETAL:  LEFT upper extremity  Inspection: The anterior incision is well approximated without evidence of warmth or erythema. Clean and dry without evidence of erythema or drainage. Motor: Able to flex and extend wrist without issue. 5/5  strength bilaterally and -10 to140 ROM of the left elbow. 2/5 strength about left shoulder (possibly limited by pain). Sensation: Grossly intact to light touch throughout axillary, radial and ulnar distrubutions. Vascular:  2+ radial pulses bilaterally.    Sensory:    Right Upper Extremity:  normal  Left Upper Extremity:  normal    Labs reviewed:  Recent Labs     06/11/19  0543 06/12/19  0532 06/13/19  0549   WBC 4.2 4.1 5.1   HGB 9.9* 10.1* 10.2*   HCT 30.4* 30.7* 31.0*    131* 140     Recent Labs     06/11/19  0543 06/12/19  0531 06/13/19  0549    138 138   K 3.3* 3.4* 3.0*    102 101   CO2 28 26 27   BUN 5* 4* 4*   CREATININE <0.5* <0.5* <0.5*   GLUCOSE 85 81 82   CALCIUM 8.3 8.5 8.4   MG 1.70* 1.80  --      Recent Labs     06/10/19  1823   INR 1.68*   PROTIME 19.1*       Lab Results   Component Value Date    COLORU dark yellow 06/10/2019    CLARITYU TURBID (A) 06/10/2019    PHUR 6.0 06/10/2019    GLUCOSEU Negative 06/10/2019    BLOODU TRACE (A) 06/10/2019    LEUKOCYTESUR MODERATE (A) 06/10/2019    BILIRUBINUR SMALL (A) 06/10/2019    UROBILINOGEN 4.0 (A) 06/10/2019    RBCUA 3 06/10/2019    WBCUA 54 (H) 06/10/2019    BACTERIA 4+ (A) 06/10/2019       Imaging:  CT ABDOMEN PELVIS W IV CONTRAST Additional Contrast? None   Final Result   1. Cirrhotic liver with findings of portal hypertension including   splenomegaly and small ascites as well as esophageal varices and collateral   vessels in the upper abdomen. 2. Generalized edema state, including anasarca and pleural effusions. Ground-glass opacities in the lung bases may represent edema or pneumonia         CT HEAD WO CONTRAST   Final Result   No acute intracranial abnormality. XR CHEST STANDARD (2 VW)   Final Result   Pulmonary vascular congestion with small posterior pleural effusions and   right basilar atelectasis         XR SHOULDER LEFT (MIN 2 VIEWS)    (Results Pending)       IMPRESSION:  S/p LEFT reverse total shoulder arthroplasty by Dr. Vidal Moreau for fracture 5/22/19  Principal Problem:    UTI (urinary tract infection)  Active Problems:    GERD (gastroesophageal reflux disease)    Hypokalemia    DM2 (diabetes mellitus, type 2) (HCC)    Hepatic encephalopathy (HCC)    Cirrhosis (HCC)    Left shoulder pain  Resolved Problems:    * No resolved hospital problems. *    RECOMMENDATIONS:   - Radiographs of left shoulder ordered to evaluate prosthesis - will follow-up on.   - Can work with PT/OT on ROM of elbow wrist and hand and gentle ROM of shoulder. Needs to limit ER of shoulder and extension of shoulder.   Typical for patients with this procedure (especially for fracture) to have ongoing pain this far out.   - Pain control; tramadol prn currently. Patient has been counseled about the detrimental effects of smoking and the need to eliminate or significantly decrease their tobacco use. I would suggest discussing this issue with their medical doctor. I would also highly recommend the immediate cessation of all forms of tobacco use. I have reviewed imaging and plan with Dr. Cedric Warren.       Haven Cowden, APRN-CNP  6/13/2019  10:36 AM

## 2019-06-13 NOTE — PROGRESS NOTES
Patient incontinent of urine. Cleaned patient and changed patient's brief. Will continue to monitor.

## 2019-06-13 NOTE — PROGRESS NOTES
Progress Note - Dr. Jessy Moss - Internal Medicine  PCP: Lillian GIRON [address] [facility] [place] / [place] [id number] [address] [facility] Day: 3  Code Status: Full Code  Current Diet: DIET LOW SODIUM 2 GM;        CC: follow up on medical issues    Subjective:   Krysten Salazar is a 59 y.o. female. She denies problems    Still a little confused  She cont to complain of l shoulder pain  She wants to be seen by ortho   I have tried to explain that her ortho doesn't come here, but I can consult the [facility] group    She denies chest pain, denies shortness of breath, denies nausea,  denies emesis. 10 system Review of Systems is reviewed with patient, and pertinent positives are listed here: None . Otherwise, Review of systems is negative. I have reviewed the patient's medical and social history in detail and updated the computerized patient record. To recap: She  has a past medical history of Anemia, Emphysema of lung ([facility] [address] GERD (gastroesophageal reflux disease), Hepatitis, Hyperlipidemia, PCOS (polycystic ovarian syndrome), Sinusitis, and SVT (supraventricular tachycardia) ([facility] [address] . She  has a past surgical history that includes Dilation and curettage of uterus; Tubal ligation;  section; Cholecystectomy; Total hip arthroplasty (Right, ); Upper gastrointestinal endoscopy (N/A, 2018); Colonoscopy (N/A, 2018); and Colonoscopy (2018). . She  reports that she has been smoking. She has a 24.00 pack-year smoking history. She has never used smokeless tobacco. She reports that she does not drink alcohol or use drugs. .        Active Hospital Problems    Diagnosis Date Noted    Left shoulder pain [M25.512] 2019    GERD (gastroesophageal reflux disease) [K21.9] 2019    Hypokalemia [E87.6] 2019    DM2 (diabetes mellitus, type 2) ([facility] [address] [E11.9] 2019    Hepatic encephalopathy ([facility] [address] [K72.90] 2019    Cirrhosis ([facility] [address] [K74.60] 2019    UTI (urinary tract infection) [N39.0] 06/10/2019       Current Facility-Administered Medications: diphenhydrAMINE (BENADRYL) tablet 12.5 mg, 12.5 mg, Oral, Nightly PRN  ciprofloxacin (CIPRO) IVPB 400 mg, 400 mg, Intravenous, Q12H  lactulose (CHRONULAC) 10 GM/15ML solution 10 g, 10 g, Oral, BID  cetirizine (ZYRTEC) tablet 10 mg, 10 mg, Oral, Daily **AND** pseudoephedrine (SUDAFED 12 HR) extended release tablet 240 mg, 240 mg, Oral, Daily  nystatin (MYCOSTATIN) 650124 UNIT/ML suspension 1,500,000 Units, 15 mL, Oral, BID  potassium chloride (KLOR-CON M) extended release tablet 40 mEq, 40 mEq, Oral, PRN **OR** potassium bicarb-citric acid (EFFER-K) effervescent tablet 40 mEq, 40 mEq, Oral, PRN **OR** potassium chloride 10 mEq/100 mL IVPB (Peripheral Line), 10 mEq, Intravenous, PRN  potassium chloride (KLOR-CON M) extended release tablet 40 mEq, 40 mEq, Oral, PRN **OR** potassium bicarb-citric acid (EFFER-K) effervescent tablet 40 mEq, 40 mEq, Oral, PRN **OR** potassium chloride 10 mEq/100 mL IVPB (Peripheral Line), 10 mEq, Intravenous, PRN  0.9 % sodium chloride bolus, 500 mL, Intravenous, PRN  hydrALAZINE (APRESOLINE) injection 10 mg, 10 mg, Intravenous, Q6H PRN  insulin lispro (HUMALOG) injection pen 0-12 Units, 0-12 Units, Subcutaneous, TID WC  insulin lispro (HUMALOG) injection pen 0-6 Units, 0-6 Units, Subcutaneous, Nightly  glucose (GLUTOSE) 40 % oral gel 15 g, 15 g, Oral, PRN  dextrose 50 % IV solution, 12.5 g, Intravenous, PRN  glucagon (rDNA) injection 1 mg, 1 mg, Intramuscular, PRN  dextrose 5 % solution, 100 mL/hr, Intravenous, PRN  furosemide (LASIX) tablet 40 mg, 40 mg, Oral, Daily  spironolactone (ALDACTONE) tablet 100 mg, 100 mg, Oral, Daily  albuterol sulfate  (90 Base) MCG/ACT inhaler 2 puff, 2 puff, Inhalation, Q4H PRN  ferrous sulfate tablet 325 mg, 325 mg, Oral, BID  FLUoxetine (PROZAC) capsule 40 mg, 40 mg, Oral, Daily  metFORMIN (GLUCOPHAGE) tablet 1,000 mg, 1,000 mg, Oral, Nightly  metoprolol succinate (TOPROL XL) extended release tablet 25 mg, 25 mg, Oral, Daily  therapeutic multivitamin-minerals 1 tablet, 1 tablet, Oral, Daily  pantoprazole (PROTONIX) tablet 40 mg, 40 mg, Oral, BID AC  rifaximin (XIFAXAN) tablet 550 mg, 550 mg, Oral, BID  traMADol (ULTRAM) tablet 50 mg, 50 mg, Oral, Q6H PRN  fluticasone (FLONASE) 50 MCG/ACT nasal spray 1 spray, 1 spray, Each Nostril, Daily  0.9 % sodium chloride infusion, , Intravenous, Continuous  sodium chloride flush 0.9 % injection 10 mL, 10 mL, Intravenous, 2 times per day  sodium chloride flush 0.9 % injection 10 mL, 10 mL, Intravenous, PRN  magnesium hydroxide (MILK OF MAGNESIA) 400 MG/5ML suspension 30 mL, 30 mL, Oral, Daily PRN  ondansetron (ZOFRAN) injection 4 mg, 4 mg, Intravenous, Q6H PRN  enoxaparin (LOVENOX) injection 40 mg, 40 mg, Subcutaneous, Daily  phenazopyridine (PYRIDIUM) tablet 200 mg, 200 mg, Oral, TID WC  budesonide (PULMICORT) nebulizer suspension 500 mcg, 0.5 mg, Nebulization, BID         Objective:  /71   Pulse 74   Temp 97.9 °F (36.6 °C) (Oral)   Resp 18   Ht 5' 4\" (1.626 m)   Wt 220 lb (99.8 kg)   SpO2 92%   BMI 37.76 kg/m²      Patient Vitals for the past 24 hrs:   BP Temp Temp src Pulse Resp SpO2   06/13/19 0515 116/71 97.9 °F (36.6 °C) Oral 74 18 92 %   06/12/19 2330 115/70 98.5 °F (36.9 °C) Oral 74 18 92 %   06/12/19 2106 -- -- -- -- 18 92 %   06/12/19 1947 116/65 98.1 °F (36.7 °C) Oral 75 18 91 %   06/12/19 1742 114/66 98.2 °F (36.8 °C) Oral 71 18 94 %   06/12/19 1215 119/73 97.9 °F (36.6 °C) Oral 76 18 92 %   06/12/19 0839 122/78 97.8 °F (36.6 °C) Oral 78 18 92 %   06/12/19 0815 -- -- -- -- -- 90 %     Patient Vitals for the past 96 hrs (Last 3 readings):   Weight   06/10/19 1757 220 lb (99.8 kg)           Intake/Output Summary (Last 24 hours) at 6/13/2019 2161  Last data filed at 6/13/2019 0518  Gross per 24 hour   Intake 2698 ml   Output --   Net 2698 ml         Physical Exam:   S1, S2 normal, no murmur, rub or gallop, regular rate and rhythm  clear to auscultation bilaterally  abdomen is soft without significant tenderness, masses, organomegaly or guarding  extremities normal, atraumatic, no cyanosis or edema    Labs:  Lab Results   Component Value Date    WBC 5.1 06/13/2019    HGB 10.2 (L) 06/13/2019    HCT 31.0 (L) 06/13/2019     06/13/2019    CHOL 96 11/06/2015    TRIG 84 11/06/2015    HDL 46 11/06/2015    ALT 19 06/12/2019    AST 35 06/12/2019     06/13/2019    K 3.0 (L) 06/13/2019     06/13/2019    CREATININE <0.5 (L) 06/13/2019    BUN 4 (L) 06/13/2019    CO2 27 06/13/2019    TSH 1.02 03/27/2010    INR 1.68 (H) 06/10/2019    LABA1C 3.6 06/11/2019    LABMICR YES 06/10/2019     Lab Results   Component Value Date    TROPONINI <0.01 06/10/2019       Recent Imaging Results are Reviewed:  Xr Chest Standard (2 Vw)    Result Date: 6/10/2019  EXAMINATION: TWO XRAY VIEWS OF THE CHEST 6/10/2019 6:34 pm COMPARISON: 01/27/2014 HISTORY: ORDERING SYSTEM PROVIDED HISTORY: anasarca TECHNOLOGIST PROVIDED HISTORY: Reason for exam:->anasarca Ordering Physician Provided Reason for Exam: sent from GI doctor with report of abnormal lab results. Pt appears Jaundice at triage. Acuity: Acute Type of Exam: Initial FINDINGS: Heart size normal.  The upper lobe vessels are prominent. There is haziness of the lungs which is in part due to overlying soft tissues. There is strandy opacity in the right lung base. There blunting of the posterior costophrenic angles     Pulmonary vascular congestion with small posterior pleural effusions and right basilar atelectasis     Ct Head Wo Contrast    Result Date: 6/10/2019  EXAMINATION: CT OF THE HEAD WITHOUT CONTRAST  6/10/2019 4:33 pm TECHNIQUE: CT of the head was performed without the administration of intravenous contrast. Dose modulation, iterative reconstruction, and/or weight based adjustment of the mA/kV was utilized to reduce the radiation dose to as low as reasonably achievable. COMPARISON: None. HISTORY: ORDERING SYSTEM PROVIDED HISTORY: confusion TECHNOLOGIST PROVIDED HISTORY: Has a \"code stroke\" or \"stroke alert\" been called? ->No Ordering Physician Provided Reason for Exam: confusion Acuity: Acute Type of Exam: Initial Relevant Medical/Surgical History: Abnormal Lab (sent from GI doctor with report of abnormal lab results. Pt appears Jaundice at triage. ) FINDINGS: BRAIN/VENTRICLES: There is no acute intracranial hemorrhage, mass effect or midline shift. No abnormal extra-axial fluid collection. The gray-white differentiation is maintained without evidence of an acute infarct. There is prominence of the ventricles and sulci due to global parenchymal volume loss. There are nonspecific areas of hypoattenuation within the periventricular and subcortical white matter, which likely represent chronic microvascular ischemic change. ORBITS: The visualized portion of the orbits demonstrate no acute abnormality. SINUSES: The visualized paranasal sinuses and mastoid air cells demonstrate no acute abnormality. SOFT TISSUES/SKULL: No acute abnormality of the visualized skull or soft tissues. No acute intracranial abnormality. Ct Abdomen Pelvis W Iv Contrast Additional Contrast? None    Result Date: 6/10/2019  EXAMINATION: CT OF THE ABDOMEN AND PELVIS WITH CONTRAST 6/10/2019 7:43 pm TECHNIQUE: CT of the abdomen and pelvis was performed with the administration of intravenous contrast. Multiplanar reformatted images are provided for review. Dose modulation, iterative reconstruction, and/or weight based adjustment of the mA/kV was utilized to reduce the radiation dose to as low as reasonably achievable. COMPARISON: None. HISTORY: ORDERING SYSTEM PROVIDED HISTORY: anasarca jaundice TECHNOLOGIST PROVIDED HISTORY: If patient is on cardiac monitor and/or pulse ox, they may be taken off cardiac monitor and pulse ox, left on O2 if currently on. All monitors reattached when patient returns to room.  Additional Plan: low Na diet, diuretics started per GI   Left shoulder pain (6/13/2019)    Assessment: Established problem. Worsening.       Plan: will ask ortho to eval here    Disp - hopefully back to snf in next 24-48 hrs if ok with GI              Ana Maria Magallanes  6/13/2019

## 2019-06-13 NOTE — PROGRESS NOTES
Occupational Therapy  Facility/Department: [address] ONCOLOGY  Daily Treatment Note  NAME: Vandana Fields  : 1955  MRN: [id number]    Date of Service: 2019    Discharge Recommendations:  Vandana Fields scored a  on the AM-PAC ADL Inpatient form. Current research shows that an AM-PAC score of 17 or less is typically not associated with a discharge to the patient's home setting. Based on the patients AM-PAC score and their current ADL deficits, it is recommended that the patient have 3-5 sessions per week of Occupational Therapy at d/c to increase the patients independence. OT Equipment Recommendations  Equipment Needed: No    Assessment   Performance deficits / Impairments: Decreased functional mobility ; Decreased ADL status; Decreased high-level IADLs;Decreased cognition;Decreased endurance;Decreased ROM; Decreased strength  Assessment: Pt not at baseline d/t above deficits; OT indicated to increase safety/independence with ADL and IADL  Treatment Diagnosis: Above deficits associated with UTI  Prognosis: Good  Exam: as above  Patient Education: slide board transfer, Importance of OOB activity  Barriers to Learning: cognition  REQUIRES OT FOLLOW UP: Yes  Activity Tolerance  Activity Tolerance: Patient Tolerated treatment well  Safety Devices  Safety Devices in place: Yes  Type of devices: Call light within reach; Left in chair;Nurse notified; Chair alarm in place; All fall risk precautions in place;Gait belt         Patient Diagnosis(es): The primary encounter diagnosis was Hyperbilirubinemia. Diagnoses of Acute cystitis without hematuria, Anasarca, and Confusion were also pertinent to this visit. has a past medical history of Anemia, Emphysema of lung ([address] GERD (gastroesophageal reflux disease), Hepatitis, Hyperlipidemia, PCOS (polycystic ovarian syndrome), Sinusitis, and SVT (supraventricular tachycardia) ([address]    has a past surgical history that includes Dilation and curettage of uterus; Tubal ligation;  section; Cholecystectomy; Total hip arthroplasty (Right, ); Upper gastrointestinal endoscopy (N/A, 2018); Colonoscopy (N/A, 2018); and Colonoscopy (2018). Restrictions  Restrictions/Precautions  Restrictions/Precautions: Weight Bearing, Fall Risk, Contact Precautions  Required Braces or Orthoses?: Yes  Lower Extremity Weight Bearing Restrictions  Right Lower Extremity Weight Bearing: Non Weight Bearing  Partial Weight Bearing Percentage Or Pounds: tibial plateau fracture, ORIF  Per out-patient ortho follow-up with Dr. Gregg Tyler on 6/3: Therapy may begin passive flexion motion through the knee while using pain as a guide  Upper Extremity Weight Bearing Restrictions  Left Upper Extremity Weight Bearing: Non Weight Bearing  Other: L humerus fracture, Reverse TSA  \"passive motion as tolerated\" per Dr. Gregg Tyler on 6/3  Required Braces or Orthoses  Right Lower Extremity Brace: Knee Immobilizer  Left Upper Extremity Brace/Splint: Sling  Position Activity Restriction  Other position/activity restrictions: 59 y.o. female  who presents to the ED complaining of worsening jaundice. She has an increased amount of confusion as well per family. She currently resides at [facility]. This is due to orthopedic rehab from recent surgeries done at [facility].  She has not had fevers. She denies any acute abdominal pain although does have some chronic cramping. She does have a history of cirrhosis and hepatitis a in the past per chart review  Subjective   General  Chart Reviewed: Yes  Additional Pertinent Hx: Tibial plateau fx, humeral fx  Diagnosis: UTI  Subjective  Subjective: Pt supine in bed upon entry, brief/gown/heidy saturated in urine. Pt agreeble to therapy session. Pt with complaint of pain and swelling at IV site RUE,nursing notified. General Comment  Comments: Pt rolling left and rolling right Mod A for brief change.  Pt supine to sit Mod A, pt sat EOB ~30 minutes for ADLs with extended time to complete all: with wash basin and washcloth pt bathed UB (setup/Min Papito wash LUE) and LB (Max A - pt able to wash both thighs). Pt groomed (wash face/oral care) setup/Supervision. Pt Dependent for brief change and don socks. Pt the slide board EOB to recliner with drop arm (downhill) Dependent (Min A x 2). Pt scoot back in recliner Dependent x 2. Pt with call light in reach and chair alarm on, nursing notified. Pain Assessment  Pain Level: 6  Pain Type: Surgical pain  Pain Location: Shoulder  Pain Orientation: Left  Pain Descriptors: Aching  Vital Signs  Patient Currently in Pain: Yes   Orientation  Orientation  Overall Orientation Status: Within Functional Limits  Objective    ADL  Grooming: Setup;Supervision; Increased time to complete  UE Bathing: Minimal assistance;Setup; Increased time to complete  LE Bathing: Maximum assistance; Increased time to complete;Setup  LE Dressing: Dependent/Total  Toileting: Dependent/Total(incontinent of urine and dependent for hygiene care and brief change)        Balance  Sitting Balance: Stand by assistance  Standing Balance: Unable to assess(comment)  Bed mobility  Rolling to Left: Moderate assistance  Rolling to Right: Moderate assistance  Supine to Sit: Moderate assistance(trunk)  Transfers  Slide Board: 2 Person assistance(Min A x 2)                       Cognition  Overall Cognitive Status: Exceptions  Arousal/Alertness: Appropriate responses to stimuli;Inconsistent responses to stimuli  Following Commands: Follows one step commands with repetition  Attention Span: Attends with cues to redirect; Difficulty attending to directions  Safety Judgement: Decreased awareness of need for safety  Problem Solving: Assistance required to correct errors made;Assistance required to identify errors made;Assistance required to implement solutions  Insights: Decreased awareness of deficits  Initiation: Requires cues for some  Sequencing: Requires cues for some                                         Plan   Plan  Times per week: 3-5  Times per day: Daily  Current Treatment Recommendations: Strengthening, Patient/Caregiver Education & Training, Self-Care / ADL, Endurance Training, Equipment Evaluation, Education, & procurement  G-Code     OutComes Score                                                  AM-PAC Score        AM-PAC Inpatient Daily Activity Raw Score: 12 (06/13/19 1430)  AM-PAC Inpatient ADL T-Scale Score : 30.6 (06/13/19 1430)  ADL Inpatient CMS 0-100% Score: 66.57 (06/13/19 1430)  ADL Inpatient CMS G-Code Modifier : CL (06/13/19 1430)    Goals  Short term goals  Time Frame for Short term goals: discharge  Short term goal 1: Fxl transfer min A - goal not met EOB to recliner 6/13  Short term goal 2: UB/LB dressing mod A - goal not met LB and not addressed UB 6/13  Short term goal 3: UB/LB bathing mod A - goal met UB and not met LB 6/13  Short term goal 4: Toileting mod A - goal not met 6/13  Long term goals  Time Frame for Long term goals : LTG=STG       Therapy Time   Individual Concurrent Group Co-treatment   Time In       1320   Time Out       1413   Minutes       53   Timed Code Treatment Minutes: [address] [place], [place] [id number]    I have reviewed and am in agreement with this treatment session.     NOEL Brown OTR/L [id number]

## 2019-06-14 VITALS
SYSTOLIC BLOOD PRESSURE: 135 MMHG | TEMPERATURE: 98.2 F | WEIGHT: 242.8 LBS | HEART RATE: 75 BPM | RESPIRATION RATE: 16 BRPM | DIASTOLIC BLOOD PRESSURE: 58 MMHG | OXYGEN SATURATION: 96 % | BODY MASS INDEX: 41.45 KG/M2 | HEIGHT: 64 IN

## 2019-06-14 LAB
ANION GAP SERPL CALCULATED.3IONS-SCNC: 11 MMOL/L (ref 3–16)
BASOPHILS ABSOLUTE: 0 K/UL (ref 0–0.2)
BASOPHILS RELATIVE PERCENT: 1 %
BUN BLDV-MCNC: 3 MG/DL (ref 7–20)
CALCIUM SERPL-MCNC: 8.2 MG/DL (ref 8.3–10.6)
CHLORIDE BLD-SCNC: 101 MMOL/L (ref 99–110)
CO2: 27 MMOL/L (ref 21–32)
CREAT SERPL-MCNC: <0.5 MG/DL (ref 0.6–1.2)
EOSINOPHILS ABSOLUTE: 0.2 K/UL (ref 0–0.6)
EOSINOPHILS RELATIVE PERCENT: 5.4 %
GFR AFRICAN AMERICAN: >60
GFR NON-AFRICAN AMERICAN: >60
GLUCOSE BLD-MCNC: 84 MG/DL (ref 70–99)
GLUCOSE BLD-MCNC: 85 MG/DL (ref 70–99)
HCT VFR BLD CALC: 31.8 % (ref 36–48)
HEMOGLOBIN: 10.3 G/DL (ref 12–16)
LYMPHOCYTES ABSOLUTE: 1.4 K/UL (ref 1–5.1)
LYMPHOCYTES RELATIVE PERCENT: 30.7 %
MCH RBC QN AUTO: 34.7 PG (ref 26–34)
MCHC RBC AUTO-ENTMCNC: 32.4 G/DL (ref 31–36)
MCV RBC AUTO: 107 FL (ref 80–100)
MONOCYTES ABSOLUTE: 0.6 K/UL (ref 0–1.3)
MONOCYTES RELATIVE PERCENT: 13.5 %
NEUTROPHILS ABSOLUTE: 2.2 K/UL (ref 1.7–7.7)
NEUTROPHILS RELATIVE PERCENT: 49.4 %
PDW BLD-RTO: 19.8 % (ref 12.4–15.4)
PERFORMED ON: NORMAL
PLATELET # BLD: 135 K/UL (ref 135–450)
PMV BLD AUTO: 7.2 FL (ref 5–10.5)
POTASSIUM SERPL-SCNC: 3.1 MMOL/L (ref 3.5–5.1)
RBC # BLD: 2.97 M/UL (ref 4–5.2)
SODIUM BLD-SCNC: 139 MMOL/L (ref 136–145)
WBC # BLD: 4.5 K/UL (ref 4–11)

## 2019-06-14 PROCEDURE — 6360000002 HC RX W HCPCS: Performed by: INTERNAL MEDICINE

## 2019-06-14 PROCEDURE — 97530 THERAPEUTIC ACTIVITIES: CPT

## 2019-06-14 PROCEDURE — 36415 COLL VENOUS BLD VENIPUNCTURE: CPT

## 2019-06-14 PROCEDURE — 94640 AIRWAY INHALATION TREATMENT: CPT

## 2019-06-14 PROCEDURE — 6370000000 HC RX 637 (ALT 250 FOR IP): Performed by: INTERNAL MEDICINE

## 2019-06-14 PROCEDURE — 97535 SELF CARE MNGMENT TRAINING: CPT

## 2019-06-14 PROCEDURE — 85025 COMPLETE CBC W/AUTO DIFF WBC: CPT

## 2019-06-14 PROCEDURE — 2580000003 HC RX 258: Performed by: INTERNAL MEDICINE

## 2019-06-14 PROCEDURE — 99232 SBSQ HOSP IP/OBS MODERATE 35: CPT | Performed by: NURSE PRACTITIONER

## 2019-06-14 PROCEDURE — 80048 BASIC METABOLIC PNL TOTAL CA: CPT

## 2019-06-14 RX ORDER — CIPROFLOXACIN 500 MG/1
500 TABLET, FILM COATED ORAL 2 TIMES DAILY
Qty: 14 TABLET | Refills: 0 | Status: SHIPPED | OUTPATIENT
Start: 2019-06-14 | End: 2019-06-21

## 2019-06-14 RX ORDER — FUROSEMIDE 40 MG/1
40 TABLET ORAL DAILY
Qty: 60 TABLET | Refills: 3
Start: 2019-06-14

## 2019-06-14 RX ORDER — TRAMADOL HYDROCHLORIDE 50 MG/1
50 TABLET ORAL EVERY 6 HOURS PRN
Qty: 12 TABLET | Refills: 0 | Status: SHIPPED | OUTPATIENT
Start: 2019-06-14 | End: 2019-06-17

## 2019-06-14 RX ORDER — SPIRONOLACTONE 100 MG/1
100 TABLET, FILM COATED ORAL DAILY
Qty: 30 TABLET | Refills: 3
Start: 2019-06-14

## 2019-06-14 RX ADMIN — PANTOPRAZOLE SODIUM 40 MG: 40 TABLET, DELAYED RELEASE ORAL at 06:16

## 2019-06-14 RX ADMIN — NYSTATIN 1500000 UNITS: 100000 SUSPENSION ORAL at 10:44

## 2019-06-14 RX ADMIN — RIFAXIMIN 550 MG: 550 TABLET ORAL at 10:46

## 2019-06-14 RX ADMIN — SODIUM CHLORIDE: 9 INJECTION, SOLUTION INTRAVENOUS at 03:04

## 2019-06-14 RX ADMIN — FERROUS SULFATE TAB 325 MG (65 MG ELEMENTAL FE) 325 MG: 325 (65 FE) TAB at 10:45

## 2019-06-14 RX ADMIN — BUDESONIDE 500 MCG: 0.5 SUSPENSION RESPIRATORY (INHALATION) at 11:23

## 2019-06-14 RX ADMIN — METOPROLOL SUCCINATE 25 MG: 25 TABLET, FILM COATED, EXTENDED RELEASE ORAL at 10:47

## 2019-06-14 RX ADMIN — FLUTICASONE PROPIONATE 1 SPRAY: 50 SPRAY, METERED NASAL at 10:48

## 2019-06-14 RX ADMIN — TRAMADOL HYDROCHLORIDE 50 MG: 50 TABLET, FILM COATED ORAL at 11:46

## 2019-06-14 RX ADMIN — FLUOXETINE 40 MG: 20 CAPSULE ORAL at 10:46

## 2019-06-14 RX ADMIN — CETIRIZINE HYDROCHLORIDE 10 MG: 10 TABLET, FILM COATED ORAL at 10:47

## 2019-06-14 RX ADMIN — ENOXAPARIN SODIUM 40 MG: 40 INJECTION SUBCUTANEOUS at 10:45

## 2019-06-14 RX ADMIN — PHENAZOPYRIDINE HYDROCHLORIDE 200 MG: 100 TABLET ORAL at 10:46

## 2019-06-14 ASSESSMENT — PAIN DESCRIPTION - PROGRESSION
CLINICAL_PROGRESSION: GRADUALLY WORSENING

## 2019-06-14 ASSESSMENT — PAIN SCALES - GENERAL: PAINLEVEL_OUTOF10: 9

## 2019-06-14 ASSESSMENT — PAIN DESCRIPTION - PAIN TYPE: TYPE: ACUTE PAIN

## 2019-06-14 ASSESSMENT — PAIN DESCRIPTION - LOCATION: LOCATION: ABDOMEN

## 2019-06-14 NOTE — PROGRESS NOTES
[facility] GI  Gastroenterology Progress Note    Traci Flowers is a 59 y.o. female patient. Principal Problem:    UTI (urinary tract infection)  Active Problems:    GERD (gastroesophageal reflux disease)    Hypokalemia    DM2 (diabetes mellitus, type 2) (HCC)    Hepatic encephalopathy (HCC)    Cirrhosis (HCC)    Left shoulder pain  Resolved Problems:    * No resolved hospital problems. *      SUBJECTIVE: Pt wo new complaints    ROS:  No fever, chills  No chest pain, palpitations  No SOB, cough  Gastrointestinal ROS: see above    Physical    VITALS:  /72   Pulse 72   Temp 98.1 °F (36.7 °C) (Oral)   Resp 16   Ht 5' 4\" (1.626 m)   Wt 242 lb 12.8 oz (110.1 kg)   SpO2 92%   BMI 41.68 kg/m²   TEMPERATURE:  Current - Temp: 98.1 °F (36.7 °C); Max - Temp  Av °F (36.7 °C)  Min: 97.7 °F (36.5 °C)  Max: 98.2 °F (36.8 °C)    NAD  Regular rate   Lungs CTA Bilaterally  Abdomen soft, ND, NT,  Bowel sounds normal.  A&Ox3. No asterixis    Data    Data Review:    Recent Labs     19  0532 19  0549 19  0529   WBC 4.1 5.1 4.5   HGB 10.1* 10.2* 10.3*   HCT 30.7* 31.0* 31.8*   .4* 108.4* 107.0*   * 140 135     Recent Labs     19  0531 19  0549 19  0529    138 139   K 3.4* 3.0* 3.1*    101 101   CO2 26 27 27   BUN 4* 4* 3*   CREATININE <0.5* <0.5* <0.5*     Recent Labs     19  0531   AST 35   ALT 19   BILIDIR 1.5*   BILITOT 3.7*   ALKPHOS 177*     No results for input(s): LIPASE, AMYLASE in the last 72 hours. No results for input(s): PROTIME, INR in the last 72 hours. No results for input(s): PTT in the last 72 hours. ASSESSMENT :    Cirrhosis - presumed due to LESLIE. Small ascites on CT - not enough for para. But also has small bilat pleural effusions and peripheral edema. Grade I esophageal varices 2018. No GI bleeding. Bili 2.9 on . Bumped with UTI but closer to baseline now. MELD yest was 17.       H/o hepatic encephalopathy - compliant now with low dose lactulose wo diarrhea. On xifaxan. UTI      PLAN :  - Pt to return to rehab on d/c and per family may be there for several more weeks but i'm concerned with pt returning home eventually which she had planned on doing. She lives alone. Family feels encephalopathy likely contributed to her fall and compliance with meds is a big concern. She might be better served in a longer term ECF. I spoke with POA last evening and son and pt this am.     - Pt's status has declined significantly since I saw her last in the office in the Fall. Some of the worsening of her hepatic function may be due to the anesthesia (2 recent surgeries) and the recent infx. Liver function has improved since hospitalization but MELD today at 17 and may need to consider referral for transplant eval once pt is weight bearing and can more easily get around to appointments and testing. will add into my office for in about 3 weeks to recalculate MELD.      - OK from GI standpoint for d/c on:   lactulose 1 TBSP BID    xifaxan 550 mg BID   lasix 40 mg daily and aldactone 100 mg daily   2 gram/no salt added diet        Sabiha Melgar MD  [address]

## 2019-06-14 NOTE — PROGRESS NOTES
[facility] Orthopedic Surgery   Progress Note    CHIEF COMPLAINT/DIAGNOSIS: S/p left Total Shoulder Arthroplasty    SUBJECTIVE: Sitting up in bed this AM.  No new complaints. Continues to have left shoulder ain with movement, but denies much pain at rest.  Was able to work with therapy yesterday. Denies numbness or tingling. OBJECTIVE  Physical    VITALS:  /72   Pulse 72   Temp 98.1 °F (36.7 °C) (Oral)   Resp 16   Ht 5' 4\" (1.626 m)   Wt 242 lb 12.8 oz (110.1 kg)   SpO2 92%   BMI 41.68 kg/m²     GENERAL: Alert and oriented x3, in no acute distress. MUSCULOSKELETAL: Able to flex and extend the wrist without issue. Left elbow ROM -10 to 140. Passive ROM of the shoulder moderately painful similar to yesterday. Smooth ROM nonetheless. INCISION:  Covered with a few remaining steri-strips; clean, dry and intact. No warmth erythema or drainage. Sensory:  Intact to light touch in axillary, radial, ulnar distributions. Vascular:   2+ radial pulses with brisk cap refill. Data    ALL MEDICATIONS HAVE BEEN REVIEWED    CBC:   Recent Labs     06/12/19  0532 06/13/19  0549 06/14/19  0529   WBC 4.1 5.1 4.5   HGB 10.1* 10.2* 10.3*   HCT 30.7* 31.0* 31.8*   * 140 135     BMP:   Recent Labs     06/12/19  0531 06/13/19  0549 06/14/19  0529    138 139   K 3.4* 3.0* 3.1*    101 101   CO2 26 27 27   BUN 4* 4* 3*   CREATININE <0.5* <0.5* <0.5*     INR: No results for input(s): INR in the last 72 hours. ASSESSMENT:  S/p left Total Shoulder Arthroplasty (5/22/19 by Dr. Noah Pineda), POD#23  Hepatic Encephalopathy/Cirrhosis  DM type II  UTI      PLAN:   The patient was reassured. No issues noted on her radiographs from yesterday. We did discuss typical post-op course for her condition/procedure and the importance of continued therapy if she wishes to have a functional shoulder with a good outcome.  Her other medical co-morbidities certainly put her at risk for a prolonged recovery that could be suboptimal if she does not manage these appropriately. She is aware of this.   - PT/OT  - D/C Plan: OK to D/C from ortho perspective and will follow-up as scheduled with Dr. Candice Durham on 7/8/19.     LALA Hernandez-CNP  6/14/2019  8:40 AM

## 2019-06-14 NOTE — DISCHARGE SUMMARY
recent infx. Liver function has improved since hospitalization but MELD today at 17 and may need to consider referral for transplant eval once pt is weight bearing and can more easily get around to appointments and testing. will add into my office for in about 3 weeks to recalculate MELD. Consults made during Hospitalization:  IP CONSULT TO INTERNAL MEDICINE  IP CONSULT TO GI  IP CONSULT TO SOCIAL WORK  IP CONSULT TO DIETITIAN  IP CONSULT TO PALLIATIVE CARE  IP CONSULT TO ORTHOPEDIC SURGERY  IP CONSULT TO FINANCIAL COUNSELOR    Treatment team at time of Discharge: Treatment Team: Attending Provider: Citlali Stark MD; Consulting Physician: Citlali Stark MD; Consulting Physician: Wenceslao Segura MD; Consulting Physician: Cliff Escudero RN Merged with [facility]; Consulting Physician: LALA Canseco CNP; Respiratory Therapist (Day): Nasim Barton RCP; Registered Nurse: Catalino Bess RN    Imaging Results:  Xr Chest Standard (2 Vw)    Result Date: 6/10/2019  EXAMINATION: TWO XRAY VIEWS OF THE CHEST 6/10/2019 6:34 pm COMPARISON: 01/27/2014 HISTORY: ORDERING SYSTEM PROVIDED HISTORY: anasarca TECHNOLOGIST PROVIDED HISTORY: Reason for exam:->anasarca Ordering Physician Provided Reason for Exam: sent from GI doctor with report of abnormal lab results. Pt appears Jaundice at triage. Acuity: Acute Type of Exam: Initial FINDINGS: Heart size normal.  The upper lobe vessels are prominent. There is haziness of the lungs which is in part due to overlying soft tissues. There is strandy opacity in the right lung base.   There blunting of the posterior costophrenic angles     Pulmonary vascular congestion with small posterior pleural effusions and right basilar atelectasis     Ct Head Wo Contrast    Result Date: 6/10/2019  EXAMINATION: CT OF THE HEAD WITHOUT CONTRAST  6/10/2019 4:33 pm TECHNIQUE: CT of the head was performed without the administration of intravenous contrast. Dose modulation, iterative reconstruction, and/or weight based adjustment of the mA/kV was utilized to reduce the radiation dose to as low as reasonably achievable. COMPARISON: None. HISTORY: ORDERING SYSTEM PROVIDED HISTORY: confusion TECHNOLOGIST PROVIDED HISTORY: Has a \"code stroke\" or \"stroke alert\" been called? ->No Ordering Physician Provided Reason for Exam: confusion Acuity: Acute Type of Exam: Initial Relevant Medical/Surgical History: Abnormal Lab (sent from GI doctor with report of abnormal lab results. Pt appears Jaundice at triage. ) FINDINGS: BRAIN/VENTRICLES: There is no acute intracranial hemorrhage, mass effect or midline shift. No abnormal extra-axial fluid collection. The gray-white differentiation is maintained without evidence of an acute infarct. There is prominence of the ventricles and sulci due to global parenchymal volume loss. There are nonspecific areas of hypoattenuation within the periventricular and subcortical white matter, which likely represent chronic microvascular ischemic change. ORBITS: The visualized portion of the orbits demonstrate no acute abnormality. SINUSES: The visualized paranasal sinuses and mastoid air cells demonstrate no acute abnormality. SOFT TISSUES/SKULL: No acute abnormality of the visualized skull or soft tissues. No acute intracranial abnormality. Ct Abdomen Pelvis W Iv Contrast Additional Contrast? None    Result Date: 6/10/2019  EXAMINATION: CT OF THE ABDOMEN AND PELVIS WITH CONTRAST 6/10/2019 7:43 pm TECHNIQUE: CT of the abdomen and pelvis was performed with the administration of intravenous contrast. Multiplanar reformatted images are provided for review. Dose modulation, iterative reconstruction, and/or weight based adjustment of the mA/kV was utilized to reduce the radiation dose to as low as reasonably achievable. COMPARISON: None.  HISTORY: ORDERING SYSTEM PROVIDED HISTORY: jacek jaundice TECHNOLOGIST PROVIDED HISTORY: If patient is on cardiac monitor and/or pulse ox, they may be taken off cardiac monitor and pulse ox, left on O2 if currently on. All monitors reattached when patient returns to room. Additional Contrast?->None Ordering Physician Provided Reason for Exam: anasarca jaundice Acuity: Unknown Type of Exam: Unknown FINDINGS: Lower Chest: Small bilateral pleural effusions. Multifocal ground-glass infiltrates in the lung bases. Compressive atelectasis in the right lower. Esophageal varices Organs: Shrunken nodular liver. Enlarged spleen. Kidneys, adrenals, pancreas unremarkable. Gallbladder surgically absent. GI/Bowel: No intrinsic gastrointestinal abnormality demonstrated. Pelvis: Streak artifact from right hip prosthesis. Urinary bladder grossly unremarkable. Reproductive organs within normal limits Peritoneum/Retroperitoneum: Small amount of ascites. No pneumoperitoneum. Large collateral vessels in the left upper quadrant. Bones/Soft Tissues: No acute bony abnormality. Body wall edema     1. Cirrhotic liver with findings of portal hypertension including splenomegaly and small ascites as well as esophageal varices and collateral vessels in the upper abdomen. 2. Generalized edema state, including anasarca and pleural effusions. Ground-glass opacities in the lung bases may represent edema or pneumonia     Xr Shoulder Left (min 2 Views)    Result Date: 6/13/2019  EXAMINATION: 3 XRAY VIEWS OF THE LEFT SHOULDER 6/13/2019 2:58 pm COMPARISON: 06/10/2019 HISTORY: ORDERING SYSTEM PROVIDED HISTORY: S/p reverseTSA; No external ROM please TECHNOLOGIST PROVIDED HISTORY: Reason for exam:->S/p reverseTSA; No external ROM please Acuity: Unknown Type of Exam: Unknown FINDINGS: A metallic left shoulder prosthesis is noted. A components appear intact. No acute fracture is visualized. Degenerative changes of the bones are evident. S/p left shoulder replacement.          Discharge Exam:  /72   Pulse 72   Temp 98.1 °F (36.7 °C) (Oral)   Resp 16   Ht 5' 4\" (1.626 m)   Wt 220 lb bicarb-citric acid (EFFER-K) 20 MEQ TBEF effervescent tablet  Take 1 tablet by mouth daily             rifaximin (XIFAXAN) 550 MG tablet  Take 550 mg by mouth 2 times daily             senna (SENOKOT) 8.6 MG tablet  Take 1 tablet by mouth every other day             spironolactone (ALDACTONE) 100 MG tablet  Take 1 tablet by mouth daily             traMADol (ULTRAM) 50 MG tablet  Take 1 tablet by mouth every 6 hours as needed for Pain for up to 3 days. triamcinolone (NASACORT ALLERGY 24HR) 55 MCG/ACT nasal inhaler  2 sprays by Each Nostril route daily                 Allergies: Allergies   Allergen Reactions    Adhesive Tape      Use paper tape       Follow up Instructions: Follow-up with PCP: CURT GIRON in 2 wk .       Total time spent on day of discharge including face-to-face visit, examination, documentation, counseling, preparation of discharge plans and followup, and discharge medicine reconciliation and presciptions is 36 minutes    Signed:  Jr Mullen MD  6/14/2019

## 2019-06-14 NOTE — PROGRESS NOTES
Patient in bed with eyes closed. Respirations are even and unlabored. Patient appears to be comfortable at this time. Fall precautions are in place and the call light is within reach. Will continue to monitor.

## 2019-06-14 NOTE — DISCHARGE INSTR - COC
Continuity of Care Form    Patient Name: Sae Rudolph   :  1955  MRN:  [id number]    Admit date:  6/10/2019  Discharge date:  19      Code Status Order: Full Code   Advance Directives:   Advance Care Flowsheet Documentation     Date/Time Healthcare Directive Type of Healthcare Directive Copy in [address] Agent's Name Healthcare Agent's Phone Number    [phone]  No, patient does not have an advance directive for healthcare treatment -- -- -- -- --          Admitting Physician:  Leonarda Fothergill, MD  PCP: Mago Galan    Discharging Nurse: [address] [employer] [employer] Unit/Room#: [id number]-7183/7172-75  Discharging Unit Phone Number: [phone]    Emergency Contact:   Extended Emergency Contact Information  Primary Emergency Contact: [facility]  Address: [address] [place] [address] Phone: [phone]  Mobile Phone: [phone]  Relation: Child  Secondary Emergency Contact: Patrick [address] Phone: [phone]  Mobile Phone: [phone]  Relation: Grandchild    Past Surgical History:  Past Surgical History:   Procedure Laterality Date     SECTION      CHOLECYSTECTOMY      COLONOSCOPY N/A 2018    COLONOSCOPY POLYPECTOMY SNARE/COLD BIOPSY performed by Chriss Calderón [facility] COLONOSCOPY  2018    COLONOSCOPY CONTROL HEMORRHAGE performed by Chriss Calderón MD at [place] [address]    TOTAL HIP ARTHROPLASTY Right 2014    TUBAL LIGATION      UPPER GASTROINTESTINAL ENDOSCOPY N/A 2018    EGD BIOPSY performed by Chriss Calderón MD at [address] [facility] ENDOSCOPY       Immunization History:   Immunization History   Administered Date(s) Administered    Influenza, High Dose (Fluzone 65 yrs and older) 2018       Active Problems:  Patient Active Problem List   Diagnosis Code    UTI (urinary tract infection) N39.0    GERD (gastroesophageal reflux disease) K21.9    Hypokalemia E87.6    DM2 (diabetes mellitus, type 2) (HCC) E11.9    Hepatic encephalopathy (HCC) K72.90    Cirrhosis (HCC) K74.60    Left shoulder pain M25.512       Isolation/Infection:   Isolation          Contact        Patient Infection Status     Infection Encounter Level? Onset Date Added Added By Resolved Resolved By Review Date    MDRO (multi-drug resistant organism) No 06/10/19 06/12/19 Urine Culture             Nurse Assessment:  Last Vital Signs: /72   Pulse 72   Temp 98.1 °F (36.7 °C) (Oral)   Resp 16   Ht 5' 4\" (1.626 m)   Wt 220 lb (99.8 kg)   SpO2 92%   BMI 37.76 kg/m²     Last documented pain score (0-10 scale): Pain Level: 8  Last Weight:   Wt Readings from Last 1 Encounters:   06/10/19 220 lb (99.8 kg)     Mental Status:  oriented    IV Access:  - None    Nursing Mobility/ADLs:  Walking   Assisted  Transfer  Assisted  Bathing  Assisted  Dressing  Assisted  Toileting  Assisted  Feeding  Independent  Med Admin  Independent  Med Delivery   whole    Wound Care Documentation and Therapy:        Elimination:  Continence:   · Bowel: No  · Bladder: No  Urinary Catheter: None   Colostomy/Ileostomy/Ileal Conduit: No       Date of Last BM: 6-13-19      Intake/Output Summary (Last 24 hours) at 6/14/2019 0802  Last data filed at 6/14/2019 0627  Gross per 24 hour   Intake 1683 ml   Output 801 ml   Net 882 ml     I/O last 3 completed shifts: In: 9778 [I.V.:1683]  Out: 801 [Urine:801]    Safety Concerns:     None    Impairments/Disabilities:      None    Nutrition Therapy:  Current Nutrition Therapy:   - Oral Diet:  Low Sodium (2gm)    Routes of Feeding: Oral  Liquids: Thin Liquids  Daily Fluid Restriction: no  Last Modified Barium Swallow with Video (Video Swallowing Test): not done    Treatments at the Time of Hospital Discharge:   Respiratory Treatments: na  Oxygen Therapy:  is not on home oxygen therapy.   Ventilator:    - No ventilator support    Rehab Therapies: Physical Therapy and Occupational Therapy  Weight Bearing Status/Restrictions: Non-weight bearing on right leg  Other Medical Equipment (for information only, NOT a DME order):  wheelchair and hospital bed  Other Treatments: an    Patient's personal belongings (please select all that are sent with patient):  None    RN SIGNATURE:  Electronically signed by Zi Sullivan RN on 6/14/19 at 10:40 AM    CASE MANAGEMENT/SOCIAL WORK SECTION    Inpatient Status Date: 06/10/19    Readmission Risk Assessment Score:  Readmission Risk              Risk of Unplanned Readmission:        23       Discharging to Facility/ Agency   · Name: [facility]  · Address:  · Phone: [phone]  · Fax: [phone]    / signature: Electronically signed by VICKY Covarrubias on 6/14/2019 at 11:13 AM    PHYSICIAN SECTION    Prognosis: Fair    Condition at Discharge: Stable    Rehab Potential (if transferring to Rehab): Good    Recommended Labs or Other Treatments After Discharge: outpatient GI appt with Dr Ollie Silverman in 2-3 wks. Physician Certification: I certify the above information and transfer of Vandana Section  is necessary for the continuing treatment of the diagnosis listed and that she requires [facility] for greater 30 days.      Update Admission H&P: No change in H&P    PHYSICIAN SIGNATURE:  Electronically signed by Ema León MD on 6/14/19 at 8:02 AM

## 2019-06-14 NOTE — CONSULTS
Palliative Care:       59 y.o. female who presented to [facility] with increased confusion and jaundice on 6/10/19. She had been recovering from RIGHT tib plateau fx (s/p ORIF) and LEFT total shoulder arthroplasty with Dr. Vidal Moreau at [facility] on 5/22/19. She has followed up with his has an outpt. She was undergoing PT at [facility]. She feels her shoulder pain has steadily gotten worse since working with PT post-operatively. She remains in a sling. Patient was said to remain NWB on the right leg, but is ok for transfers from review of notes. GI is following for hepatic encephalopathy and cirrhosis likely from LESLIE. Patient  also has small bilat pleural effusions and peripheral edema. Grade I esophageal varices 11/2018. No GI bleeding. Bili 2.9 on 5/24. Bumped with UTI but closer to baseline now. MELD yest was 17. Patient us on lactulose and Xifaxan. 3 Xrays of left shoulder 6/13/19:      FINDINGS:   A metallic left shoulder prosthesis is noted.  A components appear intact. No acute fracture is visualized.       Degenerative changes of the bones are evident.        Impression   S/p left shoulder replacement.           CT ABDOMEN PELVIS W IV CONTRAST Additional Contrast? None   Final Result   1. Cirrhotic liver with findings of portal hypertension including   splenomegaly and small ascites as well as esophageal varices and collateral   vessels in the upper abdomen. 2. Generalized edema state, including anasarca and pleural effusions.    Ground-glass opacities in the lung bases may represent edema or pneumonia           CT HEAD WO CONTRAST   Final Result   No acute intracranial abnormality.           XR CHEST STANDARD (2 VW)   Final Result   Pulmonary vascular congestion with small posterior pleural effusions and   right basilar atelectasis           XR SHOULDER LEFT (MIN 2 VIEWS)    (Results Pending)               Past Medical History:   has a past medical history of Anemia, Emphysema of lung ([facility] [address] GERD (gastroesophageal reflux disease), Hepatitis, Hyperlipidemia, PCOS (polycystic ovarian syndrome), Sinusitis, and SVT (supraventricular tachycardia) ([facility] [address] Past Surgical History:   has a past surgical history that includes Dilation and curettage of uterus; Tubal ligation;  section; Cholecystectomy; Total hip arthroplasty (Right, ); Upper gastrointestinal endoscopy (N/A, 2018); Colonoscopy (N/A, 2018); and Colonoscopy (2018). Advance Directives:    Code status is full      Problem Severity: Pain/Other Symptoms:       Describes pain in the left shoudler of moderate to severe intensity and of aching, burning nature since a few days after her surgery which is relieved by pain medications and rest.       Bed Mobility/Toileting/Transfer:      Patient needs assistance with ADLs. Social History:   reports that she has been smoking. She has a 24.00 pack-year smoking history. She has never used smokeless tobacco. She reports that she does not drink alcohol or use drugs. Family History:  family history is not on file. Plan:      Patient discharged to [facility] prior to being seen by Palliative Care.

## 2019-06-14 NOTE — PROGRESS NOTES
Physical Therapy  Facility/Department: [address] ONCOLOGY  Daily Treatment Note  NAME: Jose Bhat  : 1955  MRN: [id number]    Date of Service: 2019    Discharge Recommendations: Jose Bhat scored a 9/24 on the AM-PAC short mobility form. Current research shows that an AM-PAC score of 17 or less is typically not associated with a discharge to the patient's home setting. Based on the patients AM-PAC score and their current functional mobility deficits, it is recommended that the patient have 3-5 sessions per week of Physical Therapy at d/c to increase the patients independence. PT Equipment Recommendations  Equipment Needed: No    Assessment   Body structures, Functions, Activity limitations: Decreased functional mobility ; Decreased ADL status; Decreased strength;Decreased safe awareness;Decreased endurance;Decreased balance; Increased Pain;Decreased cognition  Assessment: Continued skilled PT to promote increased independence with bed mobility and transfers to change while maintaining NWB on RLE and LUE. Treatment Diagnosis: decreased mobility  Prognosis: Fair  Decision Making: Medium Complexity  Exam: functional mobility, balance, AMPAC   Clinical Presentation: evolving   Patient Education: PT POC, DC recommendations. REQUIRES PT FOLLOW UP: Yes  Activity Tolerance  Activity Tolerance: Patient limited by pain; Patient limited by fatigue;Patient limited by endurance  Activity Tolerance: pt. required frequent rest breaks during session      Patient Diagnosis(es): The primary encounter diagnosis was Hyperbilirubinemia. Diagnoses of Acute cystitis without hematuria, Anasarca, Confusion, and Left shoulder pain, unspecified chronicity were also pertinent to this visit. has a past medical history of Anemia, Emphysema of lung ([address] GERD (gastroesophageal reflux disease), Hepatitis, Hyperlipidemia, PCOS (polycystic ovarian syndrome), Sinusitis, and SVT (supraventricular tachycardia) ([address] has a past surgical history that includes Dilation and curettage of uterus; Tubal ligation;  section; Cholecystectomy; Total hip arthroplasty (Right, ); Upper gastrointestinal endoscopy (N/A, 2018); Colonoscopy (N/A, 2018); and Colonoscopy (2018). Restrictions  Restrictions/Precautions  Restrictions/Precautions: Weight Bearing, Fall Risk, Contact Precautions(Simultaneous filing. User may not have seen previous data.)  Required Braces or Orthoses?: Yes  Lower Extremity Weight Bearing Restrictions  Right Lower Extremity Weight Bearing: Non Weight Bearing(Simultaneous filing. User may not have seen previous data.)  Partial Weight Bearing Percentage Or Pounds: tibial plateau fracture, ORIF  Per out-patient ortho follow-up with Dr. Yolanda Zepeda on 6/3: Therapy may begin passive flexion motion through the knee while using pain as a guide(Simultaneous filing. User may not have seen previous data.)  Upper Extremity Weight Bearing Restrictions  Left Upper Extremity Weight Bearing: Non Weight Bearing(Simultaneous filing. User may not have seen previous data.)  Other: L humerus fracture, Reverse TSA  \"passive motion as tolerated\" per Dr. Yolanda Zepeda on 6/3(Simultaneous filing. User may not have seen previous data.)  Required Braces or Orthoses  Right Lower Extremity Brace: Knee Immobilizer(Simultaneous filing. User may not have seen previous data.)  Left Upper Extremity Brace/Splint: Sling(Simultaneous filing. User may not have seen previous data.)  Position Activity Restriction  Other position/activity restrictions: 59 y.o. female  who presents to the ED complaining of worsening jaundice. She has an increased amount of confusion as well per family. She currently resides at [facility]. This is due to orthopedic rehab from recent surgeries done at [facility].  She has not had fevers. She denies any acute abdominal pain although does have some chronic cramping.   She does have

## 2019-06-14 NOTE — PROGRESS NOTES
Occupational Therapy  Facility/Department: [address] ONCOLOGY  Daily Treatment Note  NAME: Nanci Gutierrez  : 1955  MRN: [id number]    Date of Service: 2019    Discharge Recommendations: Nanci Gutierrez scored a 12/24 on the AM-PAC ADL Inpatient form. Current research shows that an AM-PAC score of 17 or less is typically not associated with a discharge to the patient's home setting. Based on the patients AM-PAC score and their current ADL deficits, it is recommended that the patient have 3-5 sessions per week of Occupational Therapy at d/c to increase the patients independence. OT Equipment Recommendations  Equipment Needed: No    Assessment   Performance deficits / Impairments: Decreased functional mobility ; Decreased ADL status; Decreased high-level IADLs;Decreased cognition;Decreased endurance;Decreased ROM; Decreased strength  Assessment: Pt not at baseline d/t above deficits; OT indicated to increase safety/independence with ADL and IADL  Treatment Diagnosis: Above deficits associated with UTI  Prognosis: Good  Exam: as above  Patient Education: slide board transfer, Importance of OOB activity, safety, patient advocacy   Barriers to Learning: cognition   REQUIRES OT FOLLOW UP: Yes  Activity Tolerance  Activity Tolerance: Patient Tolerated treatment well;Patient limited by fatigue(Requested constant rest breaks )  Activity Tolerance: O2 at 87% when RN taking vitals, when cued to breath deep increased to 93%   Safety Devices  Safety Devices in place: Yes  Type of devices: Call light within reach; Left in chair;Nurse notified; Chair alarm in place; All fall risk precautions in place;Gait belt         Patient Diagnosis(es): The primary encounter diagnosis was Hyperbilirubinemia. Diagnoses of Acute cystitis without hematuria, Anasarca, Confusion, and Left shoulder pain, unspecified chronicity were also pertinent to this visit.       has a past medical history of Anemia, Emphysema of lung ([address] GERD (gastroesophageal reflux disease), Hepatitis, Hyperlipidemia, PCOS (polycystic ovarian syndrome), Sinusitis, and SVT (supraventricular tachycardia) ([facility] [address] has a past surgical history that includes Dilation and curettage of uterus; Tubal ligation;  section; Cholecystectomy; Total hip arthroplasty (Right, ); Upper gastrointestinal endoscopy (N/A, 2018); Colonoscopy (N/A, 2018); and Colonoscopy (2018). Restrictions  Restrictions/Precautions  Restrictions/Precautions: Weight Bearing, Fall Risk, Contact Precautions(Simultaneous filing. User may not have seen previous data.)  Required Braces or Orthoses?: Yes  Lower Extremity Weight Bearing Restrictions  Right Lower Extremity Weight Bearing: Non Weight Bearing(Simultaneous filing. User may not have seen previous data.)  Partial Weight Bearing Percentage Or Pounds: tibial plateau fracture, ORIF  Per out-patient ortho follow-up with Dr. Marilyn Allen on 6/3: Therapy may begin passive flexion motion through the knee while using pain as a guide(Simultaneous filing. User may not have seen previous data.)  Upper Extremity Weight Bearing Restrictions  Left Upper Extremity Weight Bearing: Non Weight Bearing(Simultaneous filing. User may not have seen previous data.)  Other: L humerus fracture, Reverse TSA  \"passive motion as tolerated\" per Dr. Marilyn Allen on 6/3(Simultaneous filing. User may not have seen previous data.)  Required Braces or Orthoses  Right Lower Extremity Brace: Knee Immobilizer(Simultaneous filing. User may not have seen previous data.)  Left Upper Extremity Brace/Splint: Sling(Simultaneous filing. User may not have seen previous data.)  Position Activity Restriction  Other position/activity restrictions: 59 y.o. female  who presents to the ED complaining of worsening jaundice. She has an increased amount of confusion as well per family. She currently resides at [facility].   This is due to orthopedic rehab from recent surgeries done at [facility].  She has not had fevers. She denies any acute abdominal pain although does have some chronic cramping. She does have a history of cirrhosis and hepatitis a in the past per chart review(Simultaneous filing. User may not have seen previous data.)  Subjective   General  Chart Reviewed: Yes  Additional Pertinent Hx: Tibial plateau fx, humeral fx  Diagnosis: UTI  Subjective  Subjective: pt supine in bed upon arrival, Brief saturated in urine. Pt agreeable to therapy session. Nurse present for part of session, taking vital and giving medication. General Comment  Comments: Pt rolling left to right with Jorge for brief change and placement of bed pan. Pt transfered to EOB, slide board transfer to chair to perfrom bathing and dressing task. Sit pivot back to bed. bed alarm on, call light within reach all needs me. Pain Assessment  Pain Type: Acute pain  Pain Location: Abdomen  Vital Signs  Patient Currently in Pain: Yes   Orientation     Objective    ADL  UE Bathing: Minimal assistance;Setup; Increased time to complete  LE Bathing: Setup;Maximum assistance; Increased time to complete  UE Dressing: Minimal assistance(gown)  LE Dressing: Dependent/Total(x2 for rolling and placing depend )  Toileting: Dependent/Total   Balance  Sitting Balance: Stand by assistance  Standing Balance: Unable to assess(comment)(unable to maintain WB resitriction )  Toilet Transfers  Toilet Transfers Comments: [id number] from left to right for placement of bed pan   Bed mobility  Rolling to Left: Minimal assistance  Rolling to Right: Minimal assistance  Supine to Sit: 2 Person assistance(Jorge of two for leg management )  Sit to Supine: 2 Person assistance(Mod of 2  )  Scooting: Moderate assistance;Minimal assistance(Mod to EOB, Min to scoot back in chair  )  Comment: Rolling for brief change, transfered to bed for bathing and changing of linens.   Transfers  Sit Pivot Transfers: 2 Person assistance;Maximum assistance(Max of 2 from chair to bed )  Slide Board: 2 Person assistance(Mod + Max d/t inability to keep WB restrictions )   Cognition  Overall Cognitive Status: Exceptions  Arousal/Alertness: Appropriate responses to stimuli;Inconsistent responses to stimuli  Following Commands: Follows one step commands with repetition  Attention Span: Attends with cues to redirect; Difficulty attending to directions  Safety Judgement: Decreased awareness of need for safety  Problem Solving: Assistance required to correct errors made;Assistance required to identify errors made;Assistance required to implement solutions  Insights: Decreased awareness of deficits  Initiation: Requires cues for some  Sequencing: Requires cues for some     Plan   Plan  Times per week: 3-5  Times per day: Daily  Current Treatment Recommendations: Strengthening, Patient/Caregiver Education & Training, Self-Care / ADL, Endurance Training, Equipment Evaluation, Education, & procurement    AM-PAC Score        AM-PAC Inpatient Daily Activity Raw Score: 12 (06/14/19 1135)  AM-PAC Inpatient ADL T-Scale Score : 30.6 (06/14/19 1135)  ADL Inpatient CMS 0-100% Score: 66.57 (06/14/19 1135)  ADL Inpatient CMS G-Code Modifier : CL (06/14/19 1135)    Goals  Short term goals  Time Frame for Short term goals: discharge  Short term goal 1: Fxl transfer min A - Mod+Max of two for slide board transfer to chair   Short term goal 2: UB/LB dressing mod A - Dependent LB dressing/ Jorge UB dressing   Short term goal 3: UB/LB bathing mod A - Jorge UB/ MaxA LB   Short term goal 4: Toileting mod A - goal not met 6/14  Long term goals  Time Frame for Long term goals : LTG=STG       Therapy Time   Individual Concurrent Group Co-treatment   Time In       1013   Time Out       1120   Minutes       67   Timed Code Treatment Minutes: 79 Minutes     Rizwan Ruiz S/JOAQUIN Occupational Therapist was present, directed the patient's care, made skilled judgement, and was responsible for assessment and treatment of the patient.           Colt Phillips, OT

## 2019-06-14 NOTE — CARE COORDINATION
DISCHARGE SUMMARY     DATE OF DISCHARGE: 06/14/19    DISCHARGE DESTINATION: [facility]    FACILITY    Level of Care: Skilled    Report Number: [phone]    Fax Number:  [phone]    Precert Obtained: yes    Hens Completed: N/A    PASARR: N/A    Notified: RN, Family and Facility/Agency-bedside Rn, pt and facility aware of transport time-pt stated she will inform family.     Prescriptions Faxed:N/A    TRANSPORTATION: [place] [address] Name: [address] [place] [address] up Time: 1130am    Phone Number: [phone]    Electronically signed by VICKY Domingo on 6/14/2019 at 11:13 AM

## 2019-06-15 LAB
BLOOD CULTURE, ROUTINE: NORMAL
CULTURE, BLOOD 2: NORMAL

## 2019-06-17 NOTE — PROGRESS NOTES
Physical Therapy  Physical Therapy Discharge Summary    Name: Citlali Farnsworth  : 1955    The pt was evaluated by PT on  and seen for 2 treatment sessions prior to DC to SNF on  per MD order. The pt's acute therapy goals were:  Short term goals  Time Frame for Short term goals: To be met by DC. Short term goal 1: Pt to perform bed mob with SBA. Short term goal 2: Pt to perform bed to chair transfers with min A x 1. Short term goal 3: Pt to perform mobility maintaining NWB R UE and L LE. Short term goal 4: Rolling bilat with SBA. Long term goals  Time Frame for Long term goals : LTGs=STGs    Patient met 0 goals during stay. Number of Refusals:0  Number of Holds: 0  During this hospitalization, the patient was educated on:  Patient Education: PT POC, DC recommendations. DC pt from PT caseload at this time. Thank you!     Farida Barrera, PT, DPT, [id number]

## 2019-06-18 NOTE — PROGRESS NOTES
Occupational Therapy  Occupational Therapy Discharge Summary    Name: José Miguel Kingston  : 1955    The pt was evaluated by OT on  and seen for 2treatment sessions prior to DC to  SNF on       per MD order. The pt's acute therapy goals were:  Short term goals  Time Frame for Short term goals: discharge  Short term goal 1: Fxl transfer min A - Mod+Max of two for slide board transfer to chair   Short term goal 2: UB/LB dressing mod A - Dependent LB dressing/ Jorge UB dressing   Short term goal 3: UB/LB bathing mod A - Jorge UB/ MaxA LB   Short term goal 4: Toileting mod A - goal not met   Long term goals  Time Frame for Long term goals : LTG=STG     Patient met 0 goals during stay. Number of Refusals:0  Number of Holds: 0  During this hospitalization, the patient was educated on:  Patient Education: slide board transfer, Importance of OOB activity, safety, patient advocacy     DC pt from OT caseload at this time. Thank you!

## 2019-07-11 PROBLEM — N39.0 UTI (URINARY TRACT INFECTION): Status: RESOLVED | Noted: 2019-06-10 | Resolved: 2019-07-11

## 2019-09-30 ENCOUNTER — HOSPITAL ENCOUNTER (INPATIENT)
Age: 64
LOS: 3 days | Discharge: HOME | DRG: 690 | End: 2019-10-03
Attending: EMERGENCY MEDICINE | Admitting: INTERNAL MEDICINE
Payer: COMMERCIAL

## 2019-09-30 DIAGNOSIS — E80.6 HYPERBILIRUBINEMIA: Primary | ICD-10-CM

## 2019-09-30 DIAGNOSIS — L03.311 ABDOMINAL WALL CELLULITIS: ICD-10-CM

## 2019-09-30 DIAGNOSIS — E87.20 LACTIC ACIDOSIS: ICD-10-CM

## 2019-09-30 DIAGNOSIS — N30.00 ACUTE CYSTITIS WITHOUT HEMATURIA: ICD-10-CM

## 2019-09-30 DIAGNOSIS — E87.6 HYPOKALEMIA: ICD-10-CM

## 2019-09-30 DIAGNOSIS — N61.0 CELLULITIS OF BREAST: ICD-10-CM

## 2019-09-30 PROBLEM — K76.82 HEPATIC ENCEPHALOPATHY: Status: RESOLVED | Noted: 2019-06-11 | Resolved: 2019-09-30

## 2019-09-30 LAB
A/G RATIO: 0.6 (ref 1.1–2.2)
ALBUMIN SERPL-MCNC: 2.4 G/DL (ref 3.4–5)
ALP BLD-CCNC: 185 U/L (ref 40–129)
ALT SERPL-CCNC: 12 U/L (ref 10–40)
AMMONIA: 44 UMOL/L (ref 11–51)
ANION GAP SERPL CALCULATED.3IONS-SCNC: 10 MMOL/L (ref 3–16)
AST SERPL-CCNC: 40 U/L (ref 15–37)
BACTERIA: ABNORMAL /HPF
BASOPHILS ABSOLUTE: 0.1 K/UL (ref 0–0.2)
BASOPHILS RELATIVE PERCENT: 1.2 %
BILIRUB SERPL-MCNC: 5.7 MG/DL (ref 0–1)
BILIRUBIN URINE: ABNORMAL
BLOOD, URINE: ABNORMAL
BUN BLDV-MCNC: 6 MG/DL (ref 7–20)
CALCIUM SERPL-MCNC: 8.5 MG/DL (ref 8.3–10.6)
CHLORIDE BLD-SCNC: 100 MMOL/L (ref 99–110)
CLARITY: ABNORMAL
CO2: 26 MMOL/L (ref 21–32)
COLOR: ABNORMAL
CREAT SERPL-MCNC: 0.7 MG/DL (ref 0.6–1.2)
EOSINOPHILS ABSOLUTE: 0.2 K/UL (ref 0–0.6)
EOSINOPHILS RELATIVE PERCENT: 3.8 %
EPITHELIAL CELLS, UA: ABNORMAL /HPF
GFR AFRICAN AMERICAN: >60
GFR NON-AFRICAN AMERICAN: >60
GLOBULIN: 4.3 G/DL
GLUCOSE BLD-MCNC: 98 MG/DL (ref 70–99)
GLUCOSE URINE: NEGATIVE MG/DL
HCT VFR BLD CALC: 36.7 % (ref 36–48)
HEMOGLOBIN: 11.9 G/DL (ref 12–16)
KETONES, URINE: ABNORMAL MG/DL
LACTIC ACID: 3.3 MMOL/L (ref 0.4–2)
LEUKOCYTE ESTERASE, URINE: ABNORMAL
LIPASE: 42 U/L (ref 13–60)
LYMPHOCYTES ABSOLUTE: 1.3 K/UL (ref 1–5.1)
LYMPHOCYTES RELATIVE PERCENT: 25 %
MCH RBC QN AUTO: 32.3 PG (ref 26–34)
MCHC RBC AUTO-ENTMCNC: 32.5 G/DL (ref 31–36)
MCV RBC AUTO: 99.3 FL (ref 80–100)
MICROSCOPIC EXAMINATION: YES
MONOCYTES ABSOLUTE: 0.5 K/UL (ref 0–1.3)
MONOCYTES RELATIVE PERCENT: 9.8 %
NEUTROPHILS ABSOLUTE: 3.2 K/UL (ref 1.7–7.7)
NEUTROPHILS RELATIVE PERCENT: 60.2 %
NITRITE, URINE: POSITIVE
PDW BLD-RTO: 19 % (ref 12.4–15.4)
PH UA: 5.5 (ref 5–8)
PLATELET # BLD: 167 K/UL (ref 135–450)
PMV BLD AUTO: 9.2 FL (ref 5–10.5)
POTASSIUM SERPL-SCNC: 2.9 MMOL/L (ref 3.5–5.1)
PROTEIN UA: ABNORMAL MG/DL
RBC # BLD: 3.7 M/UL (ref 4–5.2)
RBC UA: ABNORMAL /HPF (ref 0–2)
SODIUM BLD-SCNC: 136 MMOL/L (ref 136–145)
SPECIFIC GRAVITY UA: 1.02 (ref 1–1.03)
TOTAL PROTEIN: 6.7 G/DL (ref 6.4–8.2)
URINE REFLEX TO CULTURE: YES
URINE TYPE: ABNORMAL
UROBILINOGEN, URINE: 4 E.U./DL
WBC # BLD: 5.3 K/UL (ref 4–11)
WBC UA: ABNORMAL /HPF (ref 0–5)

## 2019-09-30 PROCEDURE — 85025 COMPLETE CBC W/AUTO DIFF WBC: CPT

## 2019-09-30 PROCEDURE — 87040 BLOOD CULTURE FOR BACTERIA: CPT

## 2019-09-30 PROCEDURE — 87077 CULTURE AEROBIC IDENTIFY: CPT

## 2019-09-30 PROCEDURE — 94760 N-INVAS EAR/PLS OXIMETRY 1: CPT

## 2019-09-30 PROCEDURE — 96367 TX/PROPH/DG ADDL SEQ IV INF: CPT

## 2019-09-30 PROCEDURE — 83690 ASSAY OF LIPASE: CPT

## 2019-09-30 PROCEDURE — 2580000003 HC RX 258: Performed by: PHYSICIAN ASSISTANT

## 2019-09-30 PROCEDURE — 82140 ASSAY OF AMMONIA: CPT

## 2019-09-30 PROCEDURE — 6370000000 HC RX 637 (ALT 250 FOR IP): Performed by: PHYSICIAN ASSISTANT

## 2019-09-30 PROCEDURE — 87086 URINE CULTURE/COLONY COUNT: CPT

## 2019-09-30 PROCEDURE — 87186 SC STD MICRODIL/AGAR DIL: CPT

## 2019-09-30 PROCEDURE — 83605 ASSAY OF LACTIC ACID: CPT

## 2019-09-30 PROCEDURE — 81001 URINALYSIS AUTO W/SCOPE: CPT

## 2019-09-30 PROCEDURE — 6360000002 HC RX W HCPCS: Performed by: PHYSICIAN ASSISTANT

## 2019-09-30 PROCEDURE — 1200000000 HC SEMI PRIVATE

## 2019-09-30 PROCEDURE — 6370000000 HC RX 637 (ALT 250 FOR IP): Performed by: INTERNAL MEDICINE

## 2019-09-30 PROCEDURE — 80053 COMPREHEN METABOLIC PANEL: CPT

## 2019-09-30 PROCEDURE — G0378 HOSPITAL OBSERVATION PER HR: HCPCS

## 2019-09-30 PROCEDURE — 96365 THER/PROPH/DIAG IV INF INIT: CPT

## 2019-09-30 PROCEDURE — 99285 EMERGENCY DEPT VISIT HI MDM: CPT

## 2019-09-30 RX ORDER — POTASSIUM CHLORIDE 20 MEQ/1
40 TABLET, EXTENDED RELEASE ORAL ONCE
Status: COMPLETED | OUTPATIENT
Start: 2019-09-30 | End: 2019-09-30

## 2019-09-30 RX ORDER — 0.9 % SODIUM CHLORIDE 0.9 %
1000 INTRAVENOUS SOLUTION INTRAVENOUS ONCE
Status: COMPLETED | OUTPATIENT
Start: 2019-09-30 | End: 2019-09-30

## 2019-09-30 RX ORDER — OMEPRAZOLE 20 MG/1
20 CAPSULE, DELAYED RELEASE ORAL DAILY
Status: DISCONTINUED | OUTPATIENT
Start: 2019-10-01 | End: 2019-09-30 | Stop reason: CLARIF

## 2019-09-30 RX ORDER — POTASSIUM CHLORIDE 7.45 MG/ML
10 INJECTION INTRAVENOUS PRN
Status: DISCONTINUED | OUTPATIENT
Start: 2019-09-30 | End: 2019-10-03 | Stop reason: HOSPADM

## 2019-09-30 RX ORDER — LACTULOSE 10 G/15ML
20 SOLUTION ORAL 2 TIMES DAILY
Status: DISCONTINUED | OUTPATIENT
Start: 2019-09-30 | End: 2019-10-03 | Stop reason: HOSPADM

## 2019-09-30 RX ORDER — SIMVASTATIN 40 MG
40 TABLET ORAL NIGHTLY
Status: DISCONTINUED | OUTPATIENT
Start: 2019-09-30 | End: 2019-09-30 | Stop reason: CLARIF

## 2019-09-30 RX ORDER — ROSUVASTATIN CALCIUM 10 MG/1
10 TABLET, COATED ORAL NIGHTLY
Status: DISCONTINUED | OUTPATIENT
Start: 2019-09-30 | End: 2019-10-03 | Stop reason: HOSPADM

## 2019-09-30 RX ORDER — SPIRONOLACTONE 25 MG/1
100 TABLET ORAL DAILY
Status: DISCONTINUED | OUTPATIENT
Start: 2019-10-01 | End: 2019-10-03 | Stop reason: HOSPADM

## 2019-09-30 RX ORDER — SENNA PLUS 8.6 MG/1
1 TABLET ORAL EVERY OTHER DAY
Status: DISCONTINUED | OUTPATIENT
Start: 2019-10-01 | End: 2019-10-03 | Stop reason: HOSPADM

## 2019-09-30 RX ORDER — METOPROLOL SUCCINATE 25 MG/1
25 TABLET, EXTENDED RELEASE ORAL DAILY
Status: DISCONTINUED | OUTPATIENT
Start: 2019-10-01 | End: 2019-10-03 | Stop reason: HOSPADM

## 2019-09-30 RX ORDER — SIMVASTATIN 40 MG
40 TABLET ORAL NIGHTLY
COMMUNITY

## 2019-09-30 RX ORDER — SODIUM CHLORIDE 0.9 % (FLUSH) 0.9 %
10 SYRINGE (ML) INJECTION PRN
Status: DISCONTINUED | OUTPATIENT
Start: 2019-09-30 | End: 2019-10-03 | Stop reason: HOSPADM

## 2019-09-30 RX ORDER — POTASSIUM CHLORIDE 20 MEQ/1
40 TABLET, EXTENDED RELEASE ORAL PRN
Status: DISCONTINUED | OUTPATIENT
Start: 2019-09-30 | End: 2019-10-03 | Stop reason: HOSPADM

## 2019-09-30 RX ORDER — SODIUM CHLORIDE 0.9 % (FLUSH) 0.9 %
10 SYRINGE (ML) INJECTION EVERY 12 HOURS SCHEDULED
Status: DISCONTINUED | OUTPATIENT
Start: 2019-09-30 | End: 2019-10-03 | Stop reason: HOSPADM

## 2019-09-30 RX ORDER — ALBUTEROL SULFATE 90 UG/1
1 AEROSOL, METERED RESPIRATORY (INHALATION) EVERY 4 HOURS PRN
Status: DISCONTINUED | OUTPATIENT
Start: 2019-09-30 | End: 2019-10-03 | Stop reason: HOSPADM

## 2019-09-30 RX ORDER — EZETIMIBE 10 MG/1
10 TABLET ORAL NIGHTLY
Status: DISCONTINUED | OUTPATIENT
Start: 2019-09-30 | End: 2019-09-30 | Stop reason: RX

## 2019-09-30 RX ORDER — POTASSIUM CHLORIDE 7.45 MG/ML
10 INJECTION INTRAVENOUS ONCE
Status: COMPLETED | OUTPATIENT
Start: 2019-09-30 | End: 2019-09-30

## 2019-09-30 RX ORDER — FERROUS SULFATE 325(65) MG
325 TABLET ORAL 2 TIMES DAILY
Status: DISCONTINUED | OUTPATIENT
Start: 2019-09-30 | End: 2019-10-03 | Stop reason: HOSPADM

## 2019-09-30 RX ORDER — ONDANSETRON 2 MG/ML
4 INJECTION INTRAMUSCULAR; INTRAVENOUS EVERY 6 HOURS PRN
Status: DISCONTINUED | OUTPATIENT
Start: 2019-09-30 | End: 2019-10-03 | Stop reason: HOSPADM

## 2019-09-30 RX ORDER — PANTOPRAZOLE SODIUM 40 MG/1
40 TABLET, DELAYED RELEASE ORAL
Status: DISCONTINUED | OUTPATIENT
Start: 2019-10-01 | End: 2019-10-03 | Stop reason: HOSPADM

## 2019-09-30 RX ORDER — FLUOXETINE HYDROCHLORIDE 20 MG/1
40 CAPSULE ORAL DAILY
Status: DISCONTINUED | OUTPATIENT
Start: 2019-10-01 | End: 2019-10-03 | Stop reason: HOSPADM

## 2019-09-30 RX ORDER — FUROSEMIDE 40 MG/1
40 TABLET ORAL DAILY
Status: DISCONTINUED | OUTPATIENT
Start: 2019-10-01 | End: 2019-10-03 | Stop reason: HOSPADM

## 2019-09-30 RX ADMIN — POTASSIUM CHLORIDE 10 MEQ: 7.46 INJECTION, SOLUTION INTRAVENOUS at 16:53

## 2019-09-30 RX ADMIN — POTASSIUM CHLORIDE 40 MEQ: 20 TABLET, EXTENDED RELEASE ORAL at 16:45

## 2019-09-30 RX ADMIN — SODIUM CHLORIDE 1000 ML: 9 INJECTION, SOLUTION INTRAVENOUS at 16:53

## 2019-09-30 RX ADMIN — RIFAXIMIN 550 MG: 550 TABLET ORAL at 23:50

## 2019-09-30 RX ADMIN — FERROUS SULFATE TAB 325 MG (65 MG ELEMENTAL FE) 325 MG: 325 (65 FE) TAB at 23:50

## 2019-09-30 RX ADMIN — ROSUVASTATIN CALCIUM 10 MG: 10 TABLET, FILM COATED ORAL at 23:50

## 2019-09-30 RX ADMIN — CEFTRIAXONE 2 G: 2 INJECTION, POWDER, FOR SOLUTION INTRAMUSCULAR; INTRAVENOUS at 19:38

## 2019-09-30 ASSESSMENT — PAIN SCALES - GENERAL: PAINLEVEL_OUTOF10: 7

## 2019-09-30 ASSESSMENT — ENCOUNTER SYMPTOMS
COUGH: 0
NAUSEA: 0
RECTAL PAIN: 0
STRIDOR: 0
DIARRHEA: 0
BLOOD IN STOOL: 0
CONSTIPATION: 0
SHORTNESS OF BREATH: 0
ANAL BLEEDING: 0
ABDOMINAL PAIN: 1
BACK PAIN: 0
COLOR CHANGE: 1
VOMITING: 0
WHEEZING: 0
ABDOMINAL DISTENTION: 1

## 2019-09-30 ASSESSMENT — PAIN DESCRIPTION - PAIN TYPE
TYPE: ACUTE PAIN
TYPE: CHRONIC PAIN

## 2019-09-30 ASSESSMENT — PAIN DESCRIPTION - LOCATION
LOCATION: LEG;SHOULDER
LOCATION: ABDOMEN

## 2019-09-30 ASSESSMENT — PAIN DESCRIPTION - ORIENTATION: ORIENTATION: RIGHT;LEFT

## 2019-10-01 LAB
ALBUMIN SERPL-MCNC: 1.9 G/DL (ref 3.4–5)
ALP BLD-CCNC: 167 U/L (ref 40–129)
ALT SERPL-CCNC: 11 U/L (ref 10–40)
ANION GAP SERPL CALCULATED.3IONS-SCNC: 8 MMOL/L (ref 3–16)
AST SERPL-CCNC: 30 U/L (ref 15–37)
BASOPHILS ABSOLUTE: 0.1 K/UL (ref 0–0.2)
BASOPHILS RELATIVE PERCENT: 1.2 %
BILIRUB SERPL-MCNC: 3.4 MG/DL (ref 0–1)
BILIRUBIN DIRECT: 1.7 MG/DL (ref 0–0.3)
BILIRUBIN, INDIRECT: 1.7 MG/DL (ref 0–1)
BUN BLDV-MCNC: 6 MG/DL (ref 7–20)
CALCIUM SERPL-MCNC: 8 MG/DL (ref 8.3–10.6)
CHLORIDE BLD-SCNC: 104 MMOL/L (ref 99–110)
CO2: 25 MMOL/L (ref 21–32)
CREAT SERPL-MCNC: 0.8 MG/DL (ref 0.6–1.2)
EOSINOPHILS ABSOLUTE: 0.3 K/UL (ref 0–0.6)
EOSINOPHILS RELATIVE PERCENT: 5.9 %
GFR AFRICAN AMERICAN: >60
GFR NON-AFRICAN AMERICAN: >60
GLUCOSE BLD-MCNC: 103 MG/DL (ref 70–99)
GLUCOSE BLD-MCNC: 115 MG/DL (ref 70–99)
GLUCOSE BLD-MCNC: 120 MG/DL (ref 70–99)
HCT VFR BLD CALC: 32.5 % (ref 36–48)
HEMOGLOBIN: 10.5 G/DL (ref 12–16)
LYMPHOCYTES ABSOLUTE: 1.9 K/UL (ref 1–5.1)
LYMPHOCYTES RELATIVE PERCENT: 34.7 %
MAGNESIUM: 2.1 MG/DL (ref 1.8–2.4)
MCH RBC QN AUTO: 32.4 PG (ref 26–34)
MCHC RBC AUTO-ENTMCNC: 32.2 G/DL (ref 31–36)
MCV RBC AUTO: 100.7 FL (ref 80–100)
MONOCYTES ABSOLUTE: 0.7 K/UL (ref 0–1.3)
MONOCYTES RELATIVE PERCENT: 11.9 %
NEUTROPHILS ABSOLUTE: 2.6 K/UL (ref 1.7–7.7)
NEUTROPHILS RELATIVE PERCENT: 46.3 %
PDW BLD-RTO: 19.2 % (ref 12.4–15.4)
PERFORMED ON: ABNORMAL
PERFORMED ON: ABNORMAL
PLATELET # BLD: 135 K/UL (ref 135–450)
PMV BLD AUTO: 8.6 FL (ref 5–10.5)
POTASSIUM REFLEX MAGNESIUM: 3.4 MMOL/L (ref 3.5–5.1)
RBC # BLD: 3.23 M/UL (ref 4–5.2)
SODIUM BLD-SCNC: 137 MMOL/L (ref 136–145)
TOTAL PROTEIN: 5.7 G/DL (ref 6.4–8.2)
WBC # BLD: 5.6 K/UL (ref 4–11)

## 2019-10-01 PROCEDURE — G0378 HOSPITAL OBSERVATION PER HR: HCPCS

## 2019-10-01 PROCEDURE — 6360000002 HC RX W HCPCS: Performed by: INTERNAL MEDICINE

## 2019-10-01 PROCEDURE — 83036 HEMOGLOBIN GLYCOSYLATED A1C: CPT

## 2019-10-01 PROCEDURE — 96367 TX/PROPH/DG ADDL SEQ IV INF: CPT

## 2019-10-01 PROCEDURE — 94760 N-INVAS EAR/PLS OXIMETRY 1: CPT

## 2019-10-01 PROCEDURE — 83735 ASSAY OF MAGNESIUM: CPT

## 2019-10-01 PROCEDURE — 6370000000 HC RX 637 (ALT 250 FOR IP): Performed by: NURSE PRACTITIONER

## 2019-10-01 PROCEDURE — 36415 COLL VENOUS BLD VENIPUNCTURE: CPT

## 2019-10-01 PROCEDURE — 6360000002 HC RX W HCPCS: Performed by: NURSE PRACTITIONER

## 2019-10-01 PROCEDURE — 85025 COMPLETE CBC W/AUTO DIFF WBC: CPT

## 2019-10-01 PROCEDURE — 1200000000 HC SEMI PRIVATE

## 2019-10-01 PROCEDURE — 96366 THER/PROPH/DIAG IV INF ADDON: CPT

## 2019-10-01 PROCEDURE — 2580000003 HC RX 258: Performed by: INTERNAL MEDICINE

## 2019-10-01 PROCEDURE — 80076 HEPATIC FUNCTION PANEL: CPT

## 2019-10-01 PROCEDURE — 96372 THER/PROPH/DIAG INJ SC/IM: CPT

## 2019-10-01 PROCEDURE — 80048 BASIC METABOLIC PNL TOTAL CA: CPT

## 2019-10-01 PROCEDURE — 6370000000 HC RX 637 (ALT 250 FOR IP): Performed by: INTERNAL MEDICINE

## 2019-10-01 RX ORDER — NICOTINE POLACRILEX 4 MG
15 LOZENGE BUCCAL PRN
Status: DISCONTINUED | OUTPATIENT
Start: 2019-10-01 | End: 2019-10-03 | Stop reason: HOSPADM

## 2019-10-01 RX ORDER — DEXTROSE MONOHYDRATE 50 MG/ML
100 INJECTION, SOLUTION INTRAVENOUS PRN
Status: DISCONTINUED | OUTPATIENT
Start: 2019-10-01 | End: 2019-10-03 | Stop reason: HOSPADM

## 2019-10-01 RX ORDER — CIPROFLOXACIN 2 MG/ML
400 INJECTION, SOLUTION INTRAVENOUS EVERY 12 HOURS
Status: DISCONTINUED | OUTPATIENT
Start: 2019-10-01 | End: 2019-10-02

## 2019-10-01 RX ORDER — TRAMADOL HYDROCHLORIDE 50 MG/1
50 TABLET ORAL EVERY 8 HOURS PRN
Status: DISCONTINUED | OUTPATIENT
Start: 2019-10-01 | End: 2019-10-03 | Stop reason: HOSPADM

## 2019-10-01 RX ORDER — DEXTROSE MONOHYDRATE 25 G/50ML
12.5 INJECTION, SOLUTION INTRAVENOUS PRN
Status: DISCONTINUED | OUTPATIENT
Start: 2019-10-01 | End: 2019-10-03 | Stop reason: HOSPADM

## 2019-10-01 RX ADMIN — PANTOPRAZOLE SODIUM 40 MG: 40 TABLET, DELAYED RELEASE ORAL at 07:11

## 2019-10-01 RX ADMIN — SPIRONOLACTONE 100 MG: 25 TABLET ORAL at 09:17

## 2019-10-01 RX ADMIN — FLUOXETINE 40 MG: 20 CAPSULE ORAL at 09:17

## 2019-10-01 RX ADMIN — Medication 10 ML: at 09:20

## 2019-10-01 RX ADMIN — FUROSEMIDE 40 MG: 40 TABLET ORAL at 09:18

## 2019-10-01 RX ADMIN — TRAMADOL HYDROCHLORIDE 50 MG: 50 TABLET, FILM COATED ORAL at 15:46

## 2019-10-01 RX ADMIN — ROSUVASTATIN CALCIUM 10 MG: 10 TABLET, FILM COATED ORAL at 21:45

## 2019-10-01 RX ADMIN — POTASSIUM CHLORIDE 40 MEQ: 1500 TABLET, EXTENDED RELEASE ORAL at 09:35

## 2019-10-01 RX ADMIN — FERROUS SULFATE TAB 325 MG (65 MG ELEMENTAL FE) 325 MG: 325 (65 FE) TAB at 21:45

## 2019-10-01 RX ADMIN — RIFAXIMIN 550 MG: 550 TABLET ORAL at 21:51

## 2019-10-01 RX ADMIN — FERROUS SULFATE TAB 325 MG (65 MG ELEMENTAL FE) 325 MG: 325 (65 FE) TAB at 09:19

## 2019-10-01 RX ADMIN — METOPROLOL SUCCINATE 25 MG: 25 TABLET, FILM COATED, EXTENDED RELEASE ORAL at 09:16

## 2019-10-01 RX ADMIN — CIPROFLOXACIN 400 MG: 2 INJECTION, SOLUTION INTRAVENOUS at 13:42

## 2019-10-01 RX ADMIN — Medication 10 ML: at 21:46

## 2019-10-01 RX ADMIN — ENOXAPARIN SODIUM 40 MG: 40 INJECTION SUBCUTANEOUS at 09:16

## 2019-10-01 RX ADMIN — SENNOSIDES 8.6 MG: 8.6 TABLET, FILM COATED ORAL at 09:18

## 2019-10-01 RX ADMIN — RIFAXIMIN 550 MG: 550 TABLET ORAL at 09:18

## 2019-10-01 ASSESSMENT — PAIN DESCRIPTION - PAIN TYPE
TYPE: CHRONIC PAIN

## 2019-10-01 ASSESSMENT — PAIN SCALES - GENERAL
PAINLEVEL_OUTOF10: 8
PAINLEVEL_OUTOF10: 7
PAINLEVEL_OUTOF10: 8
PAINLEVEL_OUTOF10: 7
PAINLEVEL_OUTOF10: 8
PAINLEVEL_OUTOF10: 6

## 2019-10-01 ASSESSMENT — PAIN DESCRIPTION - LOCATION
LOCATION: LEG;SHOULDER
LOCATION: LEG
LOCATION: LEG;SHOULDER
LOCATION: LEG;SHOULDER
LOCATION: LEG
LOCATION: LEG;SHOULDER
LOCATION: LEG;SHOULDER

## 2019-10-01 ASSESSMENT — PAIN DESCRIPTION - ORIENTATION
ORIENTATION: RIGHT
ORIENTATION: RIGHT
ORIENTATION: RIGHT;LEFT
ORIENTATION: RIGHT;LEFT

## 2019-10-02 ENCOUNTER — APPOINTMENT (OUTPATIENT)
Dept: ULTRASOUND IMAGING | Age: 64
DRG: 690 | End: 2019-10-02
Payer: COMMERCIAL

## 2019-10-02 LAB
ALBUMIN SERPL-MCNC: 1.6 G/DL (ref 3.4–5)
ALP BLD-CCNC: 198 U/L (ref 40–129)
ALT SERPL-CCNC: 10 U/L (ref 10–40)
ANION GAP SERPL CALCULATED.3IONS-SCNC: 6 MMOL/L (ref 3–16)
AST SERPL-CCNC: 27 U/L (ref 15–37)
BASOPHILS ABSOLUTE: 0.1 K/UL (ref 0–0.2)
BASOPHILS RELATIVE PERCENT: 1.2 %
BILIRUB SERPL-MCNC: 3.6 MG/DL (ref 0–1)
BILIRUBIN DIRECT: 1.7 MG/DL (ref 0–0.3)
BILIRUBIN, INDIRECT: 1.9 MG/DL (ref 0–1)
BUN BLDV-MCNC: 7 MG/DL (ref 7–20)
CALCIUM SERPL-MCNC: 8 MG/DL (ref 8.3–10.6)
CHLORIDE BLD-SCNC: 104 MMOL/L (ref 99–110)
CO2: 27 MMOL/L (ref 21–32)
CREAT SERPL-MCNC: 0.7 MG/DL (ref 0.6–1.2)
EOSINOPHILS ABSOLUTE: 0.3 K/UL (ref 0–0.6)
EOSINOPHILS RELATIVE PERCENT: 5.2 %
ESTIMATED AVERAGE GLUCOSE: 53.7 MG/DL
GFR AFRICAN AMERICAN: >60
GFR NON-AFRICAN AMERICAN: >60
GLUCOSE BLD-MCNC: 106 MG/DL (ref 70–99)
GLUCOSE BLD-MCNC: 135 MG/DL (ref 70–99)
GLUCOSE BLD-MCNC: 76 MG/DL (ref 70–99)
GLUCOSE BLD-MCNC: 86 MG/DL (ref 70–99)
GLUCOSE BLD-MCNC: 87 MG/DL (ref 70–99)
HBA1C MFR BLD: <3.5 %
HCT VFR BLD CALC: 31.2 % (ref 36–48)
HEMOGLOBIN: 10.2 G/DL (ref 12–16)
LYMPHOCYTES ABSOLUTE: 1.4 K/UL (ref 1–5.1)
LYMPHOCYTES RELATIVE PERCENT: 28.6 %
MCH RBC QN AUTO: 32.6 PG (ref 26–34)
MCHC RBC AUTO-ENTMCNC: 32.7 G/DL (ref 31–36)
MCV RBC AUTO: 99.9 FL (ref 80–100)
MONOCYTES ABSOLUTE: 0.6 K/UL (ref 0–1.3)
MONOCYTES RELATIVE PERCENT: 12.6 %
NEUTROPHILS ABSOLUTE: 2.6 K/UL (ref 1.7–7.7)
NEUTROPHILS RELATIVE PERCENT: 52.4 %
PDW BLD-RTO: 19.1 % (ref 12.4–15.4)
PERFORMED ON: ABNORMAL
PERFORMED ON: ABNORMAL
PERFORMED ON: NORMAL
PERFORMED ON: NORMAL
PHOSPHORUS: 2.1 MG/DL (ref 2.5–4.9)
PLATELET # BLD: 124 K/UL (ref 135–450)
PMV BLD AUTO: 8.9 FL (ref 5–10.5)
POTASSIUM SERPL-SCNC: 3.6 MMOL/L (ref 3.5–5.1)
RBC # BLD: 3.12 M/UL (ref 4–5.2)
SODIUM BLD-SCNC: 137 MMOL/L (ref 136–145)
TOTAL PROTEIN: 5.5 G/DL (ref 6.4–8.2)
WBC # BLD: 5 K/UL (ref 4–11)

## 2019-10-02 PROCEDURE — 80069 RENAL FUNCTION PANEL: CPT

## 2019-10-02 PROCEDURE — 6370000000 HC RX 637 (ALT 250 FOR IP): Performed by: NURSE PRACTITIONER

## 2019-10-02 PROCEDURE — 36415 COLL VENOUS BLD VENIPUNCTURE: CPT

## 2019-10-02 PROCEDURE — G0378 HOSPITAL OBSERVATION PER HR: HCPCS

## 2019-10-02 PROCEDURE — G0008 ADMIN INFLUENZA VIRUS VAC: HCPCS | Performed by: INTERNAL MEDICINE

## 2019-10-02 PROCEDURE — 76700 US EXAM ABDOM COMPLETE: CPT

## 2019-10-02 PROCEDURE — 6360000002 HC RX W HCPCS: Performed by: NURSE PRACTITIONER

## 2019-10-02 PROCEDURE — 6360000002 HC RX W HCPCS: Performed by: INTERNAL MEDICINE

## 2019-10-02 PROCEDURE — 97530 THERAPEUTIC ACTIVITIES: CPT

## 2019-10-02 PROCEDURE — 1200000000 HC SEMI PRIVATE

## 2019-10-02 PROCEDURE — 97535 SELF CARE MNGMENT TRAINING: CPT

## 2019-10-02 PROCEDURE — 97161 PT EVAL LOW COMPLEX 20 MIN: CPT

## 2019-10-02 PROCEDURE — 97165 OT EVAL LOW COMPLEX 30 MIN: CPT

## 2019-10-02 PROCEDURE — 2580000003 HC RX 258: Performed by: INTERNAL MEDICINE

## 2019-10-02 PROCEDURE — 80076 HEPATIC FUNCTION PANEL: CPT

## 2019-10-02 PROCEDURE — 6370000000 HC RX 637 (ALT 250 FOR IP): Performed by: INTERNAL MEDICINE

## 2019-10-02 PROCEDURE — 97116 GAIT TRAINING THERAPY: CPT

## 2019-10-02 PROCEDURE — 85025 COMPLETE CBC W/AUTO DIFF WBC: CPT

## 2019-10-02 PROCEDURE — 96372 THER/PROPH/DIAG INJ SC/IM: CPT

## 2019-10-02 PROCEDURE — 96366 THER/PROPH/DIAG IV INF ADDON: CPT

## 2019-10-02 PROCEDURE — 90686 IIV4 VACC NO PRSV 0.5 ML IM: CPT | Performed by: INTERNAL MEDICINE

## 2019-10-02 RX ORDER — CIPROFLOXACIN 500 MG/1
500 TABLET, FILM COATED ORAL EVERY 12 HOURS SCHEDULED
Status: DISCONTINUED | OUTPATIENT
Start: 2019-10-02 | End: 2019-10-03 | Stop reason: HOSPADM

## 2019-10-02 RX ADMIN — RIFAXIMIN 550 MG: 550 TABLET ORAL at 08:20

## 2019-10-02 RX ADMIN — ENOXAPARIN SODIUM 40 MG: 40 INJECTION SUBCUTANEOUS at 08:22

## 2019-10-02 RX ADMIN — CIPROFLOXACIN HYDROCHLORIDE 500 MG: 500 TABLET, FILM COATED ORAL at 20:58

## 2019-10-02 RX ADMIN — FUROSEMIDE 40 MG: 40 TABLET ORAL at 08:19

## 2019-10-02 RX ADMIN — FLUOXETINE 40 MG: 20 CAPSULE ORAL at 08:20

## 2019-10-02 RX ADMIN — FERROUS SULFATE TAB 325 MG (65 MG ELEMENTAL FE) 325 MG: 325 (65 FE) TAB at 20:58

## 2019-10-02 RX ADMIN — FERROUS SULFATE TAB 325 MG (65 MG ELEMENTAL FE) 325 MG: 325 (65 FE) TAB at 08:20

## 2019-10-02 RX ADMIN — ROSUVASTATIN CALCIUM 10 MG: 10 TABLET, FILM COATED ORAL at 20:58

## 2019-10-02 RX ADMIN — SPIRONOLACTONE 100 MG: 25 TABLET ORAL at 08:22

## 2019-10-02 RX ADMIN — TRAMADOL HYDROCHLORIDE 50 MG: 50 TABLET, FILM COATED ORAL at 00:51

## 2019-10-02 RX ADMIN — TRAMADOL HYDROCHLORIDE 50 MG: 50 TABLET, FILM COATED ORAL at 21:03

## 2019-10-02 RX ADMIN — INFLUENZA A VIRUS A/BRISBANE/02/2018 IVR-190 (H1N1) ANTIGEN (PROPIOLACTONE INACTIVATED), INFLUENZA A VIRUS A/KANSAS/14/2017 X-327 (H3N2) ANTIGEN (PROPIOLACTONE INACTIVATED), INFLUENZA B VIRUS B/MARYLAND/15/2016 ANTIGEN (PROPIOLACTONE INACTIVATED), INFLUENZA B VIRUS B/PHUKET/3073/2013 BVR-1B ANTIGEN (PROPIOLACTONE INACTIVATED) 0.5 ML: 15; 15; 15; 15 INJECTION, SUSPENSION INTRAMUSCULAR at 08:20

## 2019-10-02 RX ADMIN — CIPROFLOXACIN 400 MG: 2 INJECTION, SOLUTION INTRAVENOUS at 00:51

## 2019-10-02 RX ADMIN — RIFAXIMIN 550 MG: 550 TABLET ORAL at 20:59

## 2019-10-02 RX ADMIN — TRAMADOL HYDROCHLORIDE 50 MG: 50 TABLET, FILM COATED ORAL at 09:26

## 2019-10-02 RX ADMIN — Medication 10 ML: at 08:24

## 2019-10-02 RX ADMIN — METOPROLOL SUCCINATE 25 MG: 25 TABLET, FILM COATED, EXTENDED RELEASE ORAL at 08:19

## 2019-10-02 ASSESSMENT — PAIN SCALES - GENERAL
PAINLEVEL_OUTOF10: 0
PAINLEVEL_OUTOF10: 6
PAINLEVEL_OUTOF10: 0
PAINLEVEL_OUTOF10: 5
PAINLEVEL_OUTOF10: 6
PAINLEVEL_OUTOF10: 6
PAINLEVEL_OUTOF10: 0
PAINLEVEL_OUTOF10: 0
PAINLEVEL_OUTOF10: 8

## 2019-10-02 ASSESSMENT — PAIN DESCRIPTION - PAIN TYPE
TYPE: CHRONIC PAIN

## 2019-10-02 ASSESSMENT — PAIN DESCRIPTION - ORIENTATION
ORIENTATION: RIGHT
ORIENTATION: RIGHT

## 2019-10-02 ASSESSMENT — PAIN DESCRIPTION - LOCATION
LOCATION: LEG
LOCATION: LEG;SHOULDER

## 2019-10-03 VITALS
OXYGEN SATURATION: 91 % | SYSTOLIC BLOOD PRESSURE: 100 MMHG | BODY MASS INDEX: 37.79 KG/M2 | TEMPERATURE: 98.3 F | RESPIRATION RATE: 16 BRPM | HEIGHT: 65 IN | HEART RATE: 72 BPM | WEIGHT: 226.8 LBS | DIASTOLIC BLOOD PRESSURE: 63 MMHG

## 2019-10-03 LAB
ALBUMIN SERPL-MCNC: 1.7 G/DL (ref 3.4–5)
ALBUMIN SERPL-MCNC: 1.7 G/DL (ref 3.4–5)
ALP BLD-CCNC: 154 U/L (ref 40–129)
ALT SERPL-CCNC: 10 U/L (ref 10–40)
ANION GAP SERPL CALCULATED.3IONS-SCNC: 8 MMOL/L (ref 3–16)
AST SERPL-CCNC: 30 U/L (ref 15–37)
BASOPHILS ABSOLUTE: 0 K/UL (ref 0–0.2)
BASOPHILS RELATIVE PERCENT: 0.8 %
BILIRUB SERPL-MCNC: 3.7 MG/DL (ref 0–1)
BILIRUBIN DIRECT: 1.7 MG/DL (ref 0–0.3)
BILIRUBIN, INDIRECT: 2 MG/DL (ref 0–1)
BUN BLDV-MCNC: 10 MG/DL (ref 7–20)
CALCIUM SERPL-MCNC: 7.9 MG/DL (ref 8.3–10.6)
CHLORIDE BLD-SCNC: 103 MMOL/L (ref 99–110)
CO2: 26 MMOL/L (ref 21–32)
CREAT SERPL-MCNC: 0.7 MG/DL (ref 0.6–1.2)
EOSINOPHILS ABSOLUTE: 0.3 K/UL (ref 0–0.6)
EOSINOPHILS RELATIVE PERCENT: 5.6 %
GFR AFRICAN AMERICAN: >60
GFR NON-AFRICAN AMERICAN: >60
GLUCOSE BLD-MCNC: 109 MG/DL (ref 70–99)
GLUCOSE BLD-MCNC: 80 MG/DL (ref 70–99)
GLUCOSE BLD-MCNC: 80 MG/DL (ref 70–99)
HCT VFR BLD CALC: 30.6 % (ref 36–48)
HEMOGLOBIN: 10 G/DL (ref 12–16)
LYMPHOCYTES ABSOLUTE: 1.4 K/UL (ref 1–5.1)
LYMPHOCYTES RELATIVE PERCENT: 28.9 %
MCH RBC QN AUTO: 32.7 PG (ref 26–34)
MCHC RBC AUTO-ENTMCNC: 32.8 G/DL (ref 31–36)
MCV RBC AUTO: 99.7 FL (ref 80–100)
MONOCYTES ABSOLUTE: 0.6 K/UL (ref 0–1.3)
MONOCYTES RELATIVE PERCENT: 12.6 %
NEUTROPHILS ABSOLUTE: 2.5 K/UL (ref 1.7–7.7)
NEUTROPHILS RELATIVE PERCENT: 52.1 %
ORGANISM: ABNORMAL
PDW BLD-RTO: 19.2 % (ref 12.4–15.4)
PERFORMED ON: ABNORMAL
PERFORMED ON: NORMAL
PHOSPHORUS: 2.1 MG/DL (ref 2.5–4.9)
PLATELET # BLD: 115 K/UL (ref 135–450)
PMV BLD AUTO: 8.7 FL (ref 5–10.5)
POTASSIUM SERPL-SCNC: 3.6 MMOL/L (ref 3.5–5.1)
RBC # BLD: 3.07 M/UL (ref 4–5.2)
SODIUM BLD-SCNC: 137 MMOL/L (ref 136–145)
TOTAL PROTEIN: 5.5 G/DL (ref 6.4–8.2)
URINE CULTURE, ROUTINE: ABNORMAL
WBC # BLD: 4.7 K/UL (ref 4–11)

## 2019-10-03 PROCEDURE — 80076 HEPATIC FUNCTION PANEL: CPT

## 2019-10-03 PROCEDURE — 6370000000 HC RX 637 (ALT 250 FOR IP): Performed by: INTERNAL MEDICINE

## 2019-10-03 PROCEDURE — 36415 COLL VENOUS BLD VENIPUNCTURE: CPT

## 2019-10-03 PROCEDURE — 6370000000 HC RX 637 (ALT 250 FOR IP): Performed by: NURSE PRACTITIONER

## 2019-10-03 PROCEDURE — 85025 COMPLETE CBC W/AUTO DIFF WBC: CPT

## 2019-10-03 PROCEDURE — 94760 N-INVAS EAR/PLS OXIMETRY 1: CPT

## 2019-10-03 PROCEDURE — G0378 HOSPITAL OBSERVATION PER HR: HCPCS

## 2019-10-03 PROCEDURE — 96372 THER/PROPH/DIAG INJ SC/IM: CPT

## 2019-10-03 PROCEDURE — 6360000002 HC RX W HCPCS: Performed by: INTERNAL MEDICINE

## 2019-10-03 PROCEDURE — 80069 RENAL FUNCTION PANEL: CPT

## 2019-10-03 RX ORDER — CIPROFLOXACIN 500 MG/1
500 TABLET, FILM COATED ORAL EVERY 12 HOURS SCHEDULED
Qty: 10 TABLET | Refills: 0 | Status: SHIPPED | OUTPATIENT
Start: 2019-10-03 | End: 2019-10-08

## 2019-10-03 RX ADMIN — RIFAXIMIN 550 MG: 550 TABLET ORAL at 08:58

## 2019-10-03 RX ADMIN — CIPROFLOXACIN HYDROCHLORIDE 500 MG: 500 TABLET, FILM COATED ORAL at 08:58

## 2019-10-03 RX ADMIN — FUROSEMIDE 40 MG: 40 TABLET ORAL at 08:58

## 2019-10-03 RX ADMIN — PANTOPRAZOLE SODIUM 40 MG: 40 TABLET, DELAYED RELEASE ORAL at 05:19

## 2019-10-03 RX ADMIN — SENNOSIDES 8.6 MG: 8.6 TABLET, FILM COATED ORAL at 08:58

## 2019-10-03 RX ADMIN — METOPROLOL SUCCINATE 25 MG: 25 TABLET, FILM COATED, EXTENDED RELEASE ORAL at 08:58

## 2019-10-03 RX ADMIN — FERROUS SULFATE TAB 325 MG (65 MG ELEMENTAL FE) 325 MG: 325 (65 FE) TAB at 08:58

## 2019-10-03 RX ADMIN — SPIRONOLACTONE 100 MG: 25 TABLET ORAL at 08:58

## 2019-10-03 RX ADMIN — FLUOXETINE 40 MG: 20 CAPSULE ORAL at 08:58

## 2019-10-03 RX ADMIN — ENOXAPARIN SODIUM 40 MG: 40 INJECTION SUBCUTANEOUS at 08:58

## 2019-10-03 ASSESSMENT — PAIN SCALES - GENERAL
PAINLEVEL_OUTOF10: 0
PAINLEVEL_OUTOF10: 0

## 2019-10-05 LAB
BLOOD CULTURE, ROUTINE: NORMAL
CULTURE, BLOOD 2: NORMAL

## 2019-10-30 PROBLEM — N39.0 UTI (URINARY TRACT INFECTION): Status: RESOLVED | Noted: 2019-06-10 | Resolved: 2019-10-30

## 2019-10-31 ENCOUNTER — HOSPITAL ENCOUNTER (OUTPATIENT)
Age: 64
Discharge: HOME | End: 2019-10-31
Payer: COMMERCIAL

## 2019-10-31 LAB
ALBUMIN SERPL-MCNC: 2.2 G/DL (ref 3.4–5)
ALP BLD-CCNC: 159 U/L (ref 40–129)
ALT SERPL-CCNC: 15 U/L (ref 10–40)
AMMONIA: 48 UMOL/L (ref 11–51)
ANION GAP SERPL CALCULATED.3IONS-SCNC: 11 MMOL/L (ref 3–16)
AST SERPL-CCNC: 40 U/L (ref 15–37)
BILIRUB SERPL-MCNC: 4.7 MG/DL (ref 0–1)
BILIRUBIN DIRECT: 2.3 MG/DL (ref 0–0.3)
BILIRUBIN, INDIRECT: 2.4 MG/DL (ref 0–1)
BUN BLDV-MCNC: 11 MG/DL (ref 7–20)
CALCIUM SERPL-MCNC: 8.3 MG/DL (ref 8.3–10.6)
CHLORIDE BLD-SCNC: 103 MMOL/L (ref 99–110)
CO2: 22 MMOL/L (ref 21–32)
CREAT SERPL-MCNC: 0.7 MG/DL (ref 0.6–1.2)
GFR AFRICAN AMERICAN: >60
GFR NON-AFRICAN AMERICAN: >60
GLUCOSE BLD-MCNC: 94 MG/DL (ref 70–99)
INR BLD: 1.61 (ref 0.86–1.14)
PHOSPHORUS: 2.2 MG/DL (ref 2.5–4.9)
POTASSIUM SERPL-SCNC: 4.3 MMOL/L (ref 3.5–5.1)
PROTHROMBIN TIME: 18.3 SEC (ref 9.8–13)
SODIUM BLD-SCNC: 136 MMOL/L (ref 136–145)
TOTAL PROTEIN: 6.6 G/DL (ref 6.4–8.2)

## 2019-10-31 PROCEDURE — 85610 PROTHROMBIN TIME: CPT

## 2019-10-31 PROCEDURE — 80069 RENAL FUNCTION PANEL: CPT

## 2019-10-31 PROCEDURE — 36415 COLL VENOUS BLD VENIPUNCTURE: CPT

## 2019-10-31 PROCEDURE — 82140 ASSAY OF AMMONIA: CPT

## 2019-10-31 PROCEDURE — 80076 HEPATIC FUNCTION PANEL: CPT

## 2019-11-15 ENCOUNTER — HOSPITAL ENCOUNTER (OUTPATIENT)
Dept: VASCULAR LAB | Age: 64
Discharge: HOME | End: 2019-11-15
Payer: COMMERCIAL

## 2019-11-15 ENCOUNTER — HOSPITAL ENCOUNTER (OUTPATIENT)
Dept: INTERVENTIONAL RADIOLOGY/VASCULAR | Age: 64
Discharge: HOME | End: 2019-11-15
Payer: COMMERCIAL

## 2019-11-15 VITALS
TEMPERATURE: 97 F | HEART RATE: 75 BPM | SYSTOLIC BLOOD PRESSURE: 105 MMHG | OXYGEN SATURATION: 95 % | DIASTOLIC BLOOD PRESSURE: 60 MMHG | RESPIRATION RATE: 18 BRPM

## 2019-11-15 DIAGNOSIS — R60.0 BILATERAL LEG EDEMA: Primary | ICD-10-CM

## 2019-11-15 DIAGNOSIS — R18.8 OTHER ASCITES: ICD-10-CM

## 2019-11-15 LAB
ALBUMIN FLUID: <0.2 G/DL
APPEARANCE FLUID: CLEAR
APTT: 32.8 SEC (ref 26–36)
CELL COUNT FLUID TYPE: NORMAL
CLOT EVALUATION: NORMAL
COLOR FLUID: NORMAL
FLUID TYPE: NORMAL
INR BLD: 1.68 (ref 0.86–1.14)
LYMPHOCYTES, BODY FLUID: 35 %
MACROPHAGE FLUID: 32 %
MESOTHELIAL FLUID: 10 %
MONOCYTE, FLUID: 14 %
NEUTROPHIL, FLUID: 9 %
NUCLEATED CELLS FLUID: 45 /CUMM
NUMBER OF CELLS COUNTED FLUID: 100
PROTHROMBIN TIME: 19.2 SEC (ref 9.8–13)
RBC FLUID: <1000 /CUMM

## 2019-11-15 PROCEDURE — 7100000011 HC PHASE II RECOVERY - ADDTL 15 MIN

## 2019-11-15 PROCEDURE — 87070 CULTURE OTHR SPECIMN AEROBIC: CPT

## 2019-11-15 PROCEDURE — 87205 SMEAR GRAM STAIN: CPT

## 2019-11-15 PROCEDURE — C1729 CATH, DRAINAGE: HCPCS

## 2019-11-15 PROCEDURE — 88112 CYTOPATH CELL ENHANCE TECH: CPT

## 2019-11-15 PROCEDURE — 36415 COLL VENOUS BLD VENIPUNCTURE: CPT

## 2019-11-15 PROCEDURE — 7100000010 HC PHASE II RECOVERY - FIRST 15 MIN

## 2019-11-15 PROCEDURE — 85610 PROTHROMBIN TIME: CPT

## 2019-11-15 PROCEDURE — 85730 THROMBOPLASTIN TIME PARTIAL: CPT

## 2019-11-15 PROCEDURE — 82042 OTHER SOURCE ALBUMIN QUAN EA: CPT

## 2019-11-15 PROCEDURE — 89051 BODY FLUID CELL COUNT: CPT

## 2019-11-15 PROCEDURE — 93970 EXTREMITY STUDY: CPT

## 2019-11-15 PROCEDURE — 2500000003 HC RX 250 WO HCPCS: Performed by: RADIOLOGY

## 2019-11-15 PROCEDURE — 88305 TISSUE EXAM BY PATHOLOGIST: CPT

## 2019-11-15 PROCEDURE — 49083 ABD PARACENTESIS W/IMAGING: CPT

## 2019-11-15 RX ORDER — LIDOCAINE HYDROCHLORIDE 10 MG/ML
5 INJECTION, SOLUTION EPIDURAL; INFILTRATION; INTRACAUDAL; PERINEURAL ONCE
Status: COMPLETED | OUTPATIENT
Start: 2019-11-15 | End: 2019-11-15

## 2019-11-15 RX ADMIN — LIDOCAINE HYDROCHLORIDE 5 ML: 10 INJECTION, SOLUTION EPIDURAL; INFILTRATION; INTRACAUDAL; PERINEURAL at 14:32

## 2019-11-15 ASSESSMENT — PAIN SCALES - GENERAL: PAINLEVEL_OUTOF10: 0

## 2019-11-18 LAB
BODY FLUID CULTURE, STERILE: NORMAL
GRAM STAIN RESULT: NORMAL

## (undated) DEVICE — PROCEDURE KIT ENDOSCP CUST

## (undated) DEVICE — Device: Brand: DISPOSABLE ELECTROSURGICAL SNARE

## (undated) DEVICE — GOWN AURORA NONREINF LG: Brand: MEDLINE INDUSTRIES, INC.

## (undated) DEVICE — MOUTHPIECE ENDOSCP L CTRL OPN AND SIDE PORTS DISP

## (undated) DEVICE — FORCEPS BX L240CM WRK CHN 2.8MM STD CAP W/ NDL MIC MESH

## (undated) DEVICE — FIAPC® PROBE W/ FILTER 2200 A OD 2.3MM/6.9FR; L 2.2M/7.2FT: Brand: ERBE

## (undated) DEVICE — ELECTRODE PT RET AD L9FT HI MOIST COND ADH HYDRGEL CORDED

## (undated) DEVICE — SOLUTION IV IRRIG WATER 500ML POUR BRL ST 2F7113

## (undated) DEVICE — TRAP SPEC RETRV CLR PLAS POLYP IN LN SUCT QUIK CTCH

## (undated) DEVICE — SET VLV 3 PC AWS DISPOSABLE GRDIAN SCOPEVALET

## (undated) DEVICE — BW-412T DISP COMBO CLEANING BRUSH: Brand: SINGLE USE COMBINATION CLEANING BRUSH

## (undated) DEVICE — 60 ML SYRINGE,REGULAR TIP: Brand: MONOJECT